# Patient Record
Sex: FEMALE | Race: BLACK OR AFRICAN AMERICAN | NOT HISPANIC OR LATINO | URBAN - METROPOLITAN AREA
[De-identification: names, ages, dates, MRNs, and addresses within clinical notes are randomized per-mention and may not be internally consistent; named-entity substitution may affect disease eponyms.]

---

## 2019-04-15 ENCOUNTER — INPATIENT (INPATIENT)
Facility: HOSPITAL | Age: 34
LOS: 1 days | Discharge: HOME | End: 2019-04-17
Attending: INTERNAL MEDICINE | Admitting: INTERNAL MEDICINE
Payer: COMMERCIAL

## 2019-04-15 VITALS
DIASTOLIC BLOOD PRESSURE: 66 MMHG | RESPIRATION RATE: 68 BRPM | SYSTOLIC BLOOD PRESSURE: 134 MMHG | HEART RATE: 68 BPM | TEMPERATURE: 97 F | OXYGEN SATURATION: 100 %

## 2019-04-15 LAB
ALBUMIN SERPL ELPH-MCNC: 4.6 G/DL — SIGNIFICANT CHANGE UP (ref 3.5–5.2)
ALP SERPL-CCNC: 77 U/L — SIGNIFICANT CHANGE UP (ref 30–115)
ALT FLD-CCNC: 21 U/L — SIGNIFICANT CHANGE UP (ref 0–41)
ANION GAP SERPL CALC-SCNC: 15 MMOL/L — HIGH (ref 7–14)
AST SERPL-CCNC: 25 U/L — SIGNIFICANT CHANGE UP (ref 0–41)
BASOPHILS # BLD AUTO: 0.02 K/UL — SIGNIFICANT CHANGE UP (ref 0–0.2)
BASOPHILS NFR BLD AUTO: 0.3 % — SIGNIFICANT CHANGE UP (ref 0–1)
BILIRUB SERPL-MCNC: 0.4 MG/DL — SIGNIFICANT CHANGE UP (ref 0.2–1.2)
BUN SERPL-MCNC: 5 MG/DL — LOW (ref 10–20)
CALCIUM SERPL-MCNC: 9.4 MG/DL — SIGNIFICANT CHANGE UP (ref 8.5–10.1)
CHLORIDE SERPL-SCNC: 99 MMOL/L — SIGNIFICANT CHANGE UP (ref 98–110)
CO2 SERPL-SCNC: 21 MMOL/L — SIGNIFICANT CHANGE UP (ref 17–32)
CREAT SERPL-MCNC: 0.7 MG/DL — SIGNIFICANT CHANGE UP (ref 0.7–1.5)
EOSINOPHIL # BLD AUTO: 0 K/UL — SIGNIFICANT CHANGE UP (ref 0–0.7)
EOSINOPHIL NFR BLD AUTO: 0 % — SIGNIFICANT CHANGE UP (ref 0–8)
GLUCOSE SERPL-MCNC: 75 MG/DL — SIGNIFICANT CHANGE UP (ref 70–99)
HCT VFR BLD CALC: 40.8 % — SIGNIFICANT CHANGE UP (ref 37–47)
HGB BLD-MCNC: 13.3 G/DL — SIGNIFICANT CHANGE UP (ref 12–16)
IMM GRANULOCYTES NFR BLD AUTO: 0.1 % — SIGNIFICANT CHANGE UP (ref 0.1–0.3)
LIDOCAIN IGE QN: 8 U/L — SIGNIFICANT CHANGE UP (ref 7–60)
LYMPHOCYTES # BLD AUTO: 0.96 K/UL — LOW (ref 1.2–3.4)
LYMPHOCYTES # BLD AUTO: 13.4 % — LOW (ref 20.5–51.1)
MCHC RBC-ENTMCNC: 26 PG — LOW (ref 27–31)
MCHC RBC-ENTMCNC: 32.6 G/DL — SIGNIFICANT CHANGE UP (ref 32–37)
MCV RBC AUTO: 79.7 FL — LOW (ref 81–99)
MONOCYTES # BLD AUTO: 0.26 K/UL — SIGNIFICANT CHANGE UP (ref 0.1–0.6)
MONOCYTES NFR BLD AUTO: 3.6 % — SIGNIFICANT CHANGE UP (ref 1.7–9.3)
NEUTROPHILS # BLD AUTO: 5.92 K/UL — SIGNIFICANT CHANGE UP (ref 1.4–6.5)
NEUTROPHILS NFR BLD AUTO: 82.6 % — HIGH (ref 42.2–75.2)
NRBC # BLD: 0 /100 WBCS — SIGNIFICANT CHANGE UP (ref 0–0)
PLATELET # BLD AUTO: 370 K/UL — SIGNIFICANT CHANGE UP (ref 130–400)
POTASSIUM SERPL-MCNC: 4.1 MMOL/L — SIGNIFICANT CHANGE UP (ref 3.5–5)
POTASSIUM SERPL-SCNC: 4.1 MMOL/L — SIGNIFICANT CHANGE UP (ref 3.5–5)
PROT SERPL-MCNC: 7.7 G/DL — SIGNIFICANT CHANGE UP (ref 6–8)
RBC # BLD: 5.12 M/UL — SIGNIFICANT CHANGE UP (ref 4.2–5.4)
RBC # FLD: 13.4 % — SIGNIFICANT CHANGE UP (ref 11.5–14.5)
SODIUM SERPL-SCNC: 135 MMOL/L — SIGNIFICANT CHANGE UP (ref 135–146)
WBC # BLD: 7.17 K/UL — SIGNIFICANT CHANGE UP (ref 4.8–10.8)
WBC # FLD AUTO: 7.17 K/UL — SIGNIFICANT CHANGE UP (ref 4.8–10.8)

## 2019-04-15 PROCEDURE — 76705 ECHO EXAM OF ABDOMEN: CPT | Mod: 26

## 2019-04-15 PROCEDURE — 71046 X-RAY EXAM CHEST 2 VIEWS: CPT | Mod: 26

## 2019-04-15 PROCEDURE — 99285 EMERGENCY DEPT VISIT HI MDM: CPT

## 2019-04-15 RX ORDER — MORPHINE SULFATE 50 MG/1
4 CAPSULE, EXTENDED RELEASE ORAL ONCE
Qty: 0 | Refills: 0 | Status: DISCONTINUED | OUTPATIENT
Start: 2019-04-15 | End: 2019-04-15

## 2019-04-15 RX ORDER — SODIUM CHLORIDE 9 MG/ML
1000 INJECTION, SOLUTION INTRAVENOUS
Qty: 0 | Refills: 0 | Status: DISCONTINUED | OUTPATIENT
Start: 2019-04-15 | End: 2019-04-16

## 2019-04-15 RX ORDER — ENOXAPARIN SODIUM 100 MG/ML
40 INJECTION SUBCUTANEOUS DAILY
Qty: 0 | Refills: 0 | Status: DISCONTINUED | OUTPATIENT
Start: 2019-04-15 | End: 2019-04-17

## 2019-04-15 RX ORDER — MORPHINE SULFATE 50 MG/1
2 CAPSULE, EXTENDED RELEASE ORAL EVERY 6 HOURS
Qty: 0 | Refills: 0 | Status: DISCONTINUED | OUTPATIENT
Start: 2019-04-15 | End: 2019-04-15

## 2019-04-15 RX ORDER — SODIUM CHLORIDE 9 MG/ML
1000 INJECTION, SOLUTION INTRAVENOUS ONCE
Qty: 0 | Refills: 0 | Status: COMPLETED | OUTPATIENT
Start: 2019-04-15 | End: 2019-04-15

## 2019-04-15 RX ORDER — CIPROFLOXACIN LACTATE 400MG/40ML
400 VIAL (ML) INTRAVENOUS EVERY 12 HOURS
Qty: 0 | Refills: 0 | Status: DISCONTINUED | OUTPATIENT
Start: 2019-04-15 | End: 2019-04-16

## 2019-04-15 RX ORDER — ONDANSETRON 8 MG/1
4 TABLET, FILM COATED ORAL
Qty: 0 | Refills: 0 | Status: DISCONTINUED | OUTPATIENT
Start: 2019-04-15 | End: 2019-04-16

## 2019-04-15 RX ORDER — METRONIDAZOLE 500 MG
500 TABLET ORAL EVERY 8 HOURS
Qty: 0 | Refills: 0 | Status: DISCONTINUED | OUTPATIENT
Start: 2019-04-15 | End: 2019-04-16

## 2019-04-15 RX ORDER — MORPHINE SULFATE 50 MG/1
2 CAPSULE, EXTENDED RELEASE ORAL ONCE
Qty: 0 | Refills: 0 | Status: DISCONTINUED | OUTPATIENT
Start: 2019-04-15 | End: 2019-04-15

## 2019-04-15 RX ORDER — AMITRIPTYLINE HCL 25 MG
50 TABLET ORAL AT BEDTIME
Qty: 0 | Refills: 0 | Status: DISCONTINUED | OUTPATIENT
Start: 2019-04-15 | End: 2019-04-17

## 2019-04-15 RX ORDER — ONDANSETRON 8 MG/1
8 TABLET, FILM COATED ORAL THREE TIMES A DAY
Qty: 0 | Refills: 0 | Status: DISCONTINUED | OUTPATIENT
Start: 2019-04-15 | End: 2019-04-15

## 2019-04-15 RX ORDER — CHLORHEXIDINE GLUCONATE 213 G/1000ML
1 SOLUTION TOPICAL
Qty: 0 | Refills: 0 | Status: DISCONTINUED | OUTPATIENT
Start: 2019-04-15 | End: 2019-04-17

## 2019-04-15 RX ORDER — PANTOPRAZOLE SODIUM 20 MG/1
40 TABLET, DELAYED RELEASE ORAL ONCE
Qty: 0 | Refills: 0 | Status: COMPLETED | OUTPATIENT
Start: 2019-04-15 | End: 2019-04-15

## 2019-04-15 RX ORDER — PANTOPRAZOLE SODIUM 20 MG/1
40 TABLET, DELAYED RELEASE ORAL
Qty: 0 | Refills: 0 | Status: DISCONTINUED | OUTPATIENT
Start: 2019-04-15 | End: 2019-04-16

## 2019-04-15 RX ORDER — KETOROLAC TROMETHAMINE 30 MG/ML
30 SYRINGE (ML) INJECTION ONCE
Qty: 0 | Refills: 0 | Status: DISCONTINUED | OUTPATIENT
Start: 2019-04-15 | End: 2019-04-15

## 2019-04-15 RX ORDER — MORPHINE SULFATE 50 MG/1
4 CAPSULE, EXTENDED RELEASE ORAL EVERY 6 HOURS
Qty: 0 | Refills: 0 | Status: DISCONTINUED | OUTPATIENT
Start: 2019-04-15 | End: 2019-04-17

## 2019-04-15 RX ADMIN — Medication 10 MILLIGRAM(S): at 15:45

## 2019-04-15 RX ADMIN — MORPHINE SULFATE 2 MILLIGRAM(S): 50 CAPSULE, EXTENDED RELEASE ORAL at 20:20

## 2019-04-15 RX ADMIN — MORPHINE SULFATE 4 MILLIGRAM(S): 50 CAPSULE, EXTENDED RELEASE ORAL at 15:45

## 2019-04-15 RX ADMIN — Medication 50 MILLIGRAM(S): at 22:25

## 2019-04-15 RX ADMIN — SODIUM CHLORIDE 2000 MILLILITER(S): 9 INJECTION, SOLUTION INTRAVENOUS at 15:45

## 2019-04-15 RX ADMIN — MORPHINE SULFATE 4 MILLIGRAM(S): 50 CAPSULE, EXTENDED RELEASE ORAL at 23:08

## 2019-04-15 RX ADMIN — SODIUM CHLORIDE 100 MILLILITER(S): 9 INJECTION, SOLUTION INTRAVENOUS at 23:56

## 2019-04-15 RX ADMIN — PANTOPRAZOLE SODIUM 40 MILLIGRAM(S): 20 TABLET, DELAYED RELEASE ORAL at 16:52

## 2019-04-15 RX ADMIN — Medication 30 MILLIGRAM(S): at 22:33

## 2019-04-15 RX ADMIN — MORPHINE SULFATE 4 MILLIGRAM(S): 50 CAPSULE, EXTENDED RELEASE ORAL at 23:30

## 2019-04-15 RX ADMIN — ONDANSETRON 4 MILLIGRAM(S): 8 TABLET, FILM COATED ORAL at 22:53

## 2019-04-15 RX ADMIN — Medication 100 MILLIGRAM(S): at 22:25

## 2019-04-15 NOTE — ED ADULT NURSE NOTE - NSIMPLEMENTINTERV_GEN_ALL_ED
Implemented All Universal Safety Interventions:  [place] to call system. Call bell, personal items and telephone within reach. Instruct patient to call for assistance. Room bathroom lighting operational. Non-slip footwear when patient is off stretcher. Physically safe environment: no spills, clutter or unnecessary equipment. Stretcher in lowest position, wheels locked, appropriate side rails in place.

## 2019-04-15 NOTE — H&P ADULT - NSHPLABSRESULTS_GEN_ALL_CORE
13.3   7.17  )-----------( 370      ( 15 Apr 2019 16:01 )             40.8     04-15  135  |  99  |  5<L>  ----------------------------<  75  4.1   |  21  |  0.7  Ca    9.4      15 Apr 2019 16:01  TPro  7.7  /  Alb  4.6  /  TBili  0.4  /  DBili  x   /  AST  25  /  ALT  21  /  AlkPhos  77  04-15    LIVER FUNCTIONS - ( 15 Apr 2019 16:01 )  Alb: 4.6 g/dL / Pro: 7.7 g/dL / ALK PHOS: 77 U/L / ALT: 21 U/L / AST: 25 U/L / GGT: x

## 2019-04-15 NOTE — ED PROVIDER NOTE - CARE PLAN
Principal Discharge DX:	Epigastric pain  Secondary Diagnosis:	Non-intractable vomiting with nausea, unspecified vomiting type

## 2019-04-15 NOTE — ED PROVIDER NOTE - CLINICAL SUMMARY MEDICAL DECISION MAKING FREE TEXT BOX
32 Y/O F WITH ABD PAIN AND INTRACTABLE VOMITING. PT DISCHARGED FROM [facility] TODAY AND SXS PERSISTENT,. ALL DIAGNOSTIS TESTING REVIEWED. PTS SXS DID NOT IMPROVE IN THE ED WITH TX AND PT WAS ADMITTED TO MEDICINE. CT A/P FROM [facility] REVIEWED AND SCANNED TO CHART.

## 2019-04-15 NOTE — H&P ADULT - NSHPPHYSICALEXAM_GEN_ALL_CORE
General: young woman reclining on her side, c/o abd pain  Cardiac: RRR, S1S2  Lungs: CTAB  Abd: +pain on palpation in the left lower quadrant and the mid lower quadrant  LE: no swelling  Neuro: AAOx3, nonfocal

## 2019-04-15 NOTE — ED PROVIDER NOTE - NS ED ROS FT
Review of Systems    Constitutional: (-) fever  Eyes/ENT: (-) change in vision, (-) sore throat, (-) ear pain  Cardiovascular: (-) chest pain, (-) palpitation   Respiratory: (-) cough, (-) shortness of breath  Gastrointestinal: SEE HPI (-) diarrhea  Musculoskeletal: (-) neck pain, (-) back pain, (-) joint pain  : SEE HPI  Integumentary: (-) rash, (-) edema  Neurological: (-) headache, (-) altered mental status  Heme/Lymph: (-) easy bruising (-) easy bleeding

## 2019-04-15 NOTE — ED PROVIDER NOTE - OBJECTIVE STATEMENT
34 yo f pmhx sig for gastritis, h. pylori infxn pw gnawing and burning abdominal pain for 4 d in duration associated with NBNB vomiting with no dark stools or diarrhea. The abd pain has no provoking or modefying factors. Denies dysuria, urgency, frequency, vaginal bleeding or discharge. Of note pt was seen in the [facility] w/in 24 with CT showing a colitis pt was treated with Cipro/Flagyl first dose taken.     I have reviewed available current nursing and previous documentation of past medical, surgical, family, and/or social history.

## 2019-04-15 NOTE — H&P ADULT - HISTORY OF PRESENT ILLNESS
34 y/o F with PMHx H pylori s/p clearance of infection, recurrent colitis, C diff colitis, IBS , had egd and colonoscopy done 2 yrs ago that showed the h pylori and gastritis, for which she was on protonix. Pt had been on protonix 2 yrs ago, but started to use marijuana instead for her sx, which noticed helped her. She smokes 3 joints/day for the past 2 yrs. Hx goes back to 3 wks when she started experiencing nausea and vomiting, with bloody vomitus, which persisted for 2 days. She went to the ER, but was not admitted. Her sx resolved on their own and she was back to baseline. However, starting 3 days ptp she started experiencing LLQ and mid lower quadrant pain, sharp in nature, episodic, lasting for 45min and then goes away for 10-15min, but comes back. She has also been experiencing midgastric burning sensation, which she attributes to gastritis. She does not notice any specific pattern with food intake, but she is not able to keep any food down on her own. She went to [facility] for this, where she had a ct scan done that showed ____, she was d/c earlier today, but she did not see a GI doctor while there. Pt denies any fevers, but she has been experiencing chills. Her bowel movements usually alternate between diarrhea and constipation, but recently it is leaning more towards constipation. 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 32 y/o F with PMHx H pylori s/p clearance of infection, recurrent colitis, C diff colitis, IBS , had egd and colonoscopy done 2 yrs ago that showed the h pylori and gastritis, for which she was on protonix. Pt had been on protonix 2 yrs ago, but started to use marijuana instead for her sx, which noticed helped her. She smokes 3 joints/day for the past 2 yrs. Hx goes back to 3 wks when she started experiencing nausea and vomiting, with bloody vomitus, which persisted for 2 days. She went to the ER, but was not admitted. Her sx resolved on their own and she was back to baseline. However, starting 3 days ptp she started experiencing LLQ and mid lower abdominal pain, sharp in nature, episodic, lasting for 45min and then goes away for 10-15min, but comes back. She has also been experiencing midgastric burning sensation, which she attributes to gastritis. She does not notice any specific pattern with food intake, but she is not able to keep any food down on her own. She went to [facility] for this, where she had a ct scan done that showed ____, she was d/c earlier today, but she did not see a GI doctor while there. Pt denies any fevers, but she has been experiencing chills. Her bowel movements usually alternate between diarrhea and constipation, but recently it is leaning more towards constipation. 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 34 y/o F with PMHx H pylori s/p clearance of infection, recurrent colitis, C diff colitis, IBS , had egd and colonoscopy done 2 yrs ago that showed the h pylori and gastritis, for which she was on protonix. Pt had been on protonix 2 yrs ago, but started to use marijuana instead for her sx, which noticed helped her. She smokes 3 joints/day for the past 2 yrs. Hx goes back to 3 wks when she started experiencing nausea and vomiting, with bloody vomitus, which persisted for 2 days. She went to the ER, but was not admitted. Her sx resolved on their own and she was back to baseline. However, starting 3 days ptp she started experiencing LLQ and mid lower abdominal pain, sharp in nature, episodic, lasting for 45min and then goes away for 10-15min, but comes back. She has also been experiencing midgastric burning sensation, which she attributes to gastritis. She does not notice any specific pattern with food intake, but she is not able to keep any food down on her own. She went to [facility] for this, where she had a ct scan done 4/13/19 that showed "Portions of the ascending colon and descending colon are partially collapsed. Slight thickening of the walls. This could represent an early colitis." She was d/c earlier today, but she did not see a GI doctor while there. Pt denies any fevers, but she has been experiencing chills. Her bowel movements usually alternate between diarrhea and constipation, but recently it is leaning more towards constipation. 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 34 y/o F with PMHx H pylori s/p clearance of infection, recurrent colitis, C diff colitis, IBS , had egd and colonoscopy done 2 yrs ago that showed the h pylori and gastritis, for which she was on protonix. Pt had been on protonix 2 yrs ago, but started to use marijuana instead for her sx, which noticed helped her. She smokes 3 joints/day for the past 2 yrs. Hx goes back to 3 wks when she started experiencing nausea and vomiting, with bloody vomitus, which persisted for 2 days. She went to the ER, but was not admitted. Her sx resolved on their own and she was back to baseline. However, starting 3 days ptp she started experiencing LLQ and mid lower abdominal pain, sharp in nature, episodic, lasting for 45min and then goes away for 10-15min, but comes back. She has also been experiencing midgastric burning sensation, which she attributes to gastritis. She does not notice any specific pattern with food intake, but she is not able to keep any food down on her own. She went to [facility] for this, where she had a ct scan done 4/13/19 that showed "Portions of the ascending colon and descending colon are partially collapsed. Slight thickening of the walls. This could represent an early colitis." She was d/c earlier today, but she did not see a GI doctor while there. Pt denies any fevers, but she has been experiencing chills. Her bowel movements usually alternate between diarrhea and constipation, but recently it is leaning more towards constipation. 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     Attd: pt has hx of abd issues (H pylori and C Diff (after abx for UTI)) in past (3-4 yrs ago); w/ hx of smoking marijuana (2 joints/day) for the past 1-1.5 yrs - which started to help chronic nausea; pt has not lost weight; pt has been vomiting on/off for 1 wk - worst day was yesterday; she is still having trouble keeping food down. Pt on IVFs. Pt was admitted to [facility] and dx'd w/ colitis (but there is no diarrhea now - she said she had diarrhea, but now she is constipated); pt stopped smoking marijuana about 3 days ago; pt does not feel the need to take showers to relieve symptoms; she has no fever, but did have chills; pt looks fairly comfortable now, but did recently vomit this am - bilious, no blood; no SOB, no CP; rest as above

## 2019-04-15 NOTE — ED PROVIDER NOTE - PROGRESS NOTE DETAILS
I personally evaluated the patient. I reviewed the Resident’s or Physician Assistant’s note (as assigned above), and agree with the findings and plan except as documented in my note.   32 Y/O F IBS, GERD, H/O C DIFF COLITIS, C/O 5 DAYS OF ABD PAIN. ABD PAIN IS UPPER ABD AND IS CONSTANT AND ASSOCIATED WITH N/V. + CONSTIPATIONS, LAST BM 2 ADYS AGO. NO BPR OR MELENA. NO HEMATEMESIS. NO FEVER, CHILLS. PT ADMITTED TO [facility] YESTERDAY AND DISCHARGED TODAY. CT A/P REVIEWED AND SCANNED TO CHART. PT HAD EGD AND COLONOSCOPY APPROX ONE YEAR AGO. VITALS NOTED. ALERT OX3 NAD WELL APPEARING. NCAT PERRL. EOMI. OP NORMAL. MMM. NECK SUPPLE. LUNGS CLEAR B/L. RRR. S1S2. ABD- SOFT, DISTENDED. + DIFFUSE TENDERNESS. NO REBOUND OR GUARDING. NO LEG EDEMA. CN 2-12 INTACT. NEURO EXAM NONFOCAL.

## 2019-04-15 NOTE — H&P ADULT - NSICDXPASTMEDICALHX_GEN_ALL_CORE_FT
PAST MEDICAL HISTORY:  Colitis     Gastritis, Helicobacter pylori     H/O Clostridium difficile infection     History of marijuana use

## 2019-04-15 NOTE — ED ADULT NURSE REASSESSMENT NOTE - NS ED NURSE REASSESS COMMENT FT1
Pt c/o pain at this time, OLEKSANDR Montgomery made aware, states he will come to evaluate at bedside as soon as he can. Pt in no acute distress, will continue to monitor pt.

## 2019-04-15 NOTE — H&P ADULT - ASSESSMENT
Recurrent Colitis w/ Concomittant Cannabinoid hyperemesis syndrome  -ct scan from [facility] shows:  -will check RUQ sono to check biliary tract/gallstones, no need to repeat scan at this time  -start cipro and flagyl  -monitor for any signs of infection  -check c diff considering pt has hx of it, send stool pcr studies  -morphine prn for pain control  -IVF, zofran, protonix  -pt has been educated to stop w/ cannaboid use, c/w amitriptyline  -GI c/s-why does pt have so many recurrent bouts of colitis? scope candidate?  -pt does not appear to be pain-seeking    DVT PPX: lovenox sq Recurrent Colitis w/ Concomittant Cannabinoid hyperemesis syndrome  -ct scan from [facility] shows:  -will check RUQ sono to check biliary tract/gallstones, no need to repeat scan at this time  -start cipro and flagyl  -monitor for any signs of infection  -check c diff considering pt has hx of it, send stool pcr studies  -morphine prn for pain control  -IVF, zofran, protonix  -pt has been educated to stop w/ cannaboid use, c/w amitriptyline  -GI c/s-why does pt have so many recurrent bouts of colitis? scope candidate?  -pt does not appear to be pain-seeking  -check ua since there is pain in the lower midabd area  -pregnancy neg    DVT PPX: lovenox sq Recurrent Colitis w/ Concomittant Cannabinoid hyperemesis syndrome  -ct scan from [facility] shows:  -will check RUQ sono to check biliary tract/gallstones, no need to repeat scan at this time  -start cipro and flagyl  -monitor for any signs of infection  -check c diff considering pt has hx of it, send stool pcr studies  -morphine prn for pain control  -IVF, zofran, protonix  -pt has been educated to stop w/ cannaboid use, c/w amitriptyline  -GI c/s-why does pt have so many recurrent bouts of colitis? scope candidate?  -pt does not appear to be pain-seeking  -check ua since there is pain in the lower midabdominal area  -pregnancy neg    DVT PPX: lovenox sq Recurrent Colitis w/ Concomittant Cannabinoid hyperemesis syndrome  -CT scan from [facility] 4/13/19 shows concern for early colitis  -will check RUQ sono to check biliary tract/gallstones, no need to repeat scan at this time  -start cipro and flagyl  -monitor for any signs of infection  -check c diff considering pt has hx of it, send stool pcr studies  -morphine prn for pain control  -IVF, zofran, protonix  -pt has been educated to stop w/ cannaboid use, c/w amitriptyline  -GI c/s-why does pt have so many recurrent bouts of colitis? scope candidate?  -pt does not appear to be pain-seeking  -check ua since there is pain in the lower midabdominal area  -pregnancy neg    DVT PPX: lovenox sq # prob cannabinoid hyperemesis syndrome  CT scan from [facility] 4/13/19 shows concern for "early colitis" - but this could be just underdistention of colon - will try to obtain report  check RUQ sono to check biliary tract/gallstones  d/c cipro and flagyl - doubt colitis  if pt has diarrhea, check for c diff  morphine 4mg iv q6 prn pain for now  d/c toradol  zofran was not helping, try reglan 10mg iv q6 prn n/v  offered benzo - pt declined  IVFs - D5 100/hr  diet: npo per GI  GI will likely do EGD here, poss   GI might do c-scope, might be outpt - will see  pt has been educated to stop w/ cannaboid use, c/w amitriptyline  no dysuria, so would not check u/a  pregnancy neg  PPI iv q24    # DVT PPX: lovenox sq    Dispo: f/u GI; cannot leave until she can stefan food and water w/out vomiting and pain better

## 2019-04-15 NOTE — H&P ADULT - NSHPSOCIALHISTORY_GEN_ALL_CORE
Smokes 3 joints/day for the past 2 yrs. Denies cig use, used to smoker 1 pack/wk for 5-6 yrs but stopped a couple yrs ago. Smokes 3 joints/day for the past 2 yrs. Denies cig use, used to smoker 1 pack/wk for 5-6 yrs but stopped a couple yrs ago.  denies alcohol abuse  no other drug abuse, besides marijuana

## 2019-04-15 NOTE — ED PROVIDER NOTE - PHYSICAL EXAMINATION
Physical Exam    Vital Signs: I have reviewed the initial vital signs.  Constitutional: well-nourished, appears stated age, no acute distress  Eyes: PERRLA, and symmetrical lids.  ENT: Neck supple with no adenopathy, moist MM.  Cardiovascular: regular rate, regular rhythm, well-perfused extremities  Respiratory: unlabored respiratory effort, clear to auscultation bilaterally  Gastrointestinal: soft, +mild epigastric ttp, no guarding, non distended, no rebound ttp  Musculoskeletal: supple neck, no lower extremity edema  Integumentary: warm, dry, no rash  Neurologic: awake, alert, extremities’ motor and sensory functions grossly intact  Psychiatric: A&Ox3

## 2019-04-15 NOTE — ED ADULT NURSE NOTE - CHIEF COMPLAINT QUOTE
Pt complains of abdominal pain Pt was seen this morning at [facility] and was discharged

## 2019-04-15 NOTE — ED ADULT NURSE NOTE - OBJECTIVE STATEMENT
Pt complains of lower abdominal pain x 2 days. Pt sattes she was previously at [facility] and was dc with colitis

## 2019-04-16 ENCOUNTER — RESULT REVIEW (OUTPATIENT)
Age: 34
End: 2019-04-16

## 2019-04-16 ENCOUNTER — TRANSCRIPTION ENCOUNTER (OUTPATIENT)
Age: 34
End: 2019-04-16

## 2019-04-16 LAB
ALBUMIN SERPL ELPH-MCNC: 3.6 G/DL — SIGNIFICANT CHANGE UP (ref 3.5–5.2)
ALP SERPL-CCNC: 60 U/L — SIGNIFICANT CHANGE UP (ref 30–115)
ALT FLD-CCNC: 16 U/L — SIGNIFICANT CHANGE UP (ref 0–41)
ANION GAP SERPL CALC-SCNC: 17 MMOL/L — HIGH (ref 7–14)
APTT BLD: 36.5 SEC — SIGNIFICANT CHANGE UP (ref 27–39.2)
AST SERPL-CCNC: 27 U/L — SIGNIFICANT CHANGE UP (ref 0–41)
BASOPHILS # BLD AUTO: 0.02 K/UL — SIGNIFICANT CHANGE UP (ref 0–0.2)
BASOPHILS # BLD AUTO: 0.04 K/UL — SIGNIFICANT CHANGE UP (ref 0–0.2)
BASOPHILS NFR BLD AUTO: 0.3 % — SIGNIFICANT CHANGE UP (ref 0–1)
BASOPHILS NFR BLD AUTO: 0.4 % — SIGNIFICANT CHANGE UP (ref 0–1)
BILIRUB SERPL-MCNC: 0.5 MG/DL — SIGNIFICANT CHANGE UP (ref 0.2–1.2)
BUN SERPL-MCNC: 8 MG/DL — LOW (ref 10–20)
CALCIUM SERPL-MCNC: 8.6 MG/DL — SIGNIFICANT CHANGE UP (ref 8.5–10.1)
CHLORIDE SERPL-SCNC: 104 MMOL/L — SIGNIFICANT CHANGE UP (ref 98–110)
CO2 SERPL-SCNC: 18 MMOL/L — SIGNIFICANT CHANGE UP (ref 17–32)
CREAT SERPL-MCNC: 0.7 MG/DL — SIGNIFICANT CHANGE UP (ref 0.7–1.5)
EOSINOPHIL # BLD AUTO: 0.02 K/UL — SIGNIFICANT CHANGE UP (ref 0–0.7)
EOSINOPHIL # BLD AUTO: 0.11 K/UL — SIGNIFICANT CHANGE UP (ref 0–0.7)
EOSINOPHIL NFR BLD AUTO: 0.3 % — SIGNIFICANT CHANGE UP (ref 0–8)
EOSINOPHIL NFR BLD AUTO: 1.2 % — SIGNIFICANT CHANGE UP (ref 0–8)
GLUCOSE BLDC GLUCOMTR-MCNC: 81 MG/DL — SIGNIFICANT CHANGE UP (ref 70–99)
GLUCOSE SERPL-MCNC: 54 MG/DL — LOW (ref 70–99)
HCT VFR BLD CALC: 34.3 % — LOW (ref 37–47)
HCT VFR BLD CALC: 39.1 % — SIGNIFICANT CHANGE UP (ref 37–47)
HGB BLD-MCNC: 11.3 G/DL — LOW (ref 12–16)
HGB BLD-MCNC: 12.7 G/DL — SIGNIFICANT CHANGE UP (ref 12–16)
IMM GRANULOCYTES NFR BLD AUTO: 0.2 % — SIGNIFICANT CHANGE UP (ref 0.1–0.3)
IMM GRANULOCYTES NFR BLD AUTO: 0.5 % — HIGH (ref 0.1–0.3)
INR BLD: 1.15 RATIO — SIGNIFICANT CHANGE UP (ref 0.65–1.3)
LYMPHOCYTES # BLD AUTO: 0.83 K/UL — LOW (ref 1.2–3.4)
LYMPHOCYTES # BLD AUTO: 12.6 % — LOW (ref 20.5–51.1)
LYMPHOCYTES # BLD AUTO: 2.67 K/UL — SIGNIFICANT CHANGE UP (ref 1.2–3.4)
LYMPHOCYTES # BLD AUTO: 30 % — SIGNIFICANT CHANGE UP (ref 20.5–51.1)
MCHC RBC-ENTMCNC: 26.2 PG — LOW (ref 27–31)
MCHC RBC-ENTMCNC: 26.2 PG — LOW (ref 27–31)
MCHC RBC-ENTMCNC: 32.5 G/DL — SIGNIFICANT CHANGE UP (ref 32–37)
MCHC RBC-ENTMCNC: 32.9 G/DL — SIGNIFICANT CHANGE UP (ref 32–37)
MCV RBC AUTO: 79.6 FL — LOW (ref 81–99)
MCV RBC AUTO: 80.6 FL — LOW (ref 81–99)
MONOCYTES # BLD AUTO: 0.15 K/UL — SIGNIFICANT CHANGE UP (ref 0.1–0.6)
MONOCYTES # BLD AUTO: 0.6 K/UL — SIGNIFICANT CHANGE UP (ref 0.1–0.6)
MONOCYTES NFR BLD AUTO: 2.3 % — SIGNIFICANT CHANGE UP (ref 1.7–9.3)
MONOCYTES NFR BLD AUTO: 6.7 % — SIGNIFICANT CHANGE UP (ref 1.7–9.3)
NEUTROPHILS # BLD AUTO: 5.46 K/UL — SIGNIFICANT CHANGE UP (ref 1.4–6.5)
NEUTROPHILS # BLD AUTO: 5.56 K/UL — SIGNIFICANT CHANGE UP (ref 1.4–6.5)
NEUTROPHILS NFR BLD AUTO: 61.5 % — SIGNIFICANT CHANGE UP (ref 42.2–75.2)
NEUTROPHILS NFR BLD AUTO: 84 % — HIGH (ref 42.2–75.2)
NRBC # BLD: 0 /100 WBCS — SIGNIFICANT CHANGE UP (ref 0–0)
NRBC # BLD: 0 /100 WBCS — SIGNIFICANT CHANGE UP (ref 0–0)
PLATELET # BLD AUTO: 310 K/UL — SIGNIFICANT CHANGE UP (ref 130–400)
PLATELET # BLD AUTO: 325 K/UL — SIGNIFICANT CHANGE UP (ref 130–400)
POTASSIUM SERPL-MCNC: 3.9 MMOL/L — SIGNIFICANT CHANGE UP (ref 3.5–5)
POTASSIUM SERPL-SCNC: 3.9 MMOL/L — SIGNIFICANT CHANGE UP (ref 3.5–5)
PROT SERPL-MCNC: 5.8 G/DL — LOW (ref 6–8)
PROTHROM AB SERPL-ACNC: 13.2 SEC — HIGH (ref 9.95–12.87)
RBC # BLD: 4.31 M/UL — SIGNIFICANT CHANGE UP (ref 4.2–5.4)
RBC # BLD: 4.85 M/UL — SIGNIFICANT CHANGE UP (ref 4.2–5.4)
RBC # FLD: 13.6 % — SIGNIFICANT CHANGE UP (ref 11.5–14.5)
RBC # FLD: 13.6 % — SIGNIFICANT CHANGE UP (ref 11.5–14.5)
SODIUM SERPL-SCNC: 139 MMOL/L — SIGNIFICANT CHANGE UP (ref 135–146)
WBC # BLD: 6.61 K/UL — SIGNIFICANT CHANGE UP (ref 4.8–10.8)
WBC # BLD: 8.9 K/UL — SIGNIFICANT CHANGE UP (ref 4.8–10.8)
WBC # FLD AUTO: 6.61 K/UL — SIGNIFICANT CHANGE UP (ref 4.8–10.8)
WBC # FLD AUTO: 8.9 K/UL — SIGNIFICANT CHANGE UP (ref 4.8–10.8)

## 2019-04-16 PROCEDURE — 88305 TISSUE EXAM BY PATHOLOGIST: CPT | Mod: 26

## 2019-04-16 PROCEDURE — 88312 SPECIAL STAINS GROUP 1: CPT | Mod: 26

## 2019-04-16 RX ORDER — PANTOPRAZOLE SODIUM 20 MG/1
40 TABLET, DELAYED RELEASE ORAL EVERY 12 HOURS
Qty: 0 | Refills: 0 | Status: DISCONTINUED | OUTPATIENT
Start: 2019-04-16 | End: 2019-04-17

## 2019-04-16 RX ORDER — SODIUM CHLORIDE 9 MG/ML
1000 INJECTION, SOLUTION INTRAVENOUS
Qty: 0 | Refills: 0 | Status: DISCONTINUED | OUTPATIENT
Start: 2019-04-16 | End: 2019-04-17

## 2019-04-16 RX ORDER — ALPRAZOLAM 0.25 MG
0.25 TABLET ORAL EVERY 8 HOURS
Qty: 0 | Refills: 0 | Status: DISCONTINUED | OUTPATIENT
Start: 2019-04-16 | End: 2019-04-17

## 2019-04-16 RX ORDER — METOCLOPRAMIDE HCL 10 MG
10 TABLET ORAL EVERY 8 HOURS
Qty: 0 | Refills: 0 | Status: DISCONTINUED | OUTPATIENT
Start: 2019-04-16 | End: 2019-04-17

## 2019-04-16 RX ORDER — SODIUM CHLORIDE 9 MG/ML
1000 INJECTION, SOLUTION INTRAVENOUS
Qty: 0 | Refills: 0 | Status: DISCONTINUED | OUTPATIENT
Start: 2019-04-16 | End: 2019-04-16

## 2019-04-16 RX ADMIN — SODIUM CHLORIDE 100 MILLILITER(S): 9 INJECTION, SOLUTION INTRAVENOUS at 10:52

## 2019-04-16 RX ADMIN — PANTOPRAZOLE SODIUM 40 MILLIGRAM(S): 20 TABLET, DELAYED RELEASE ORAL at 17:08

## 2019-04-16 RX ADMIN — MORPHINE SULFATE 4 MILLIGRAM(S): 50 CAPSULE, EXTENDED RELEASE ORAL at 06:19

## 2019-04-16 RX ADMIN — ONDANSETRON 4 MILLIGRAM(S): 8 TABLET, FILM COATED ORAL at 07:31

## 2019-04-16 RX ADMIN — Medication 50 MILLIGRAM(S): at 21:57

## 2019-04-16 RX ADMIN — Medication 200 MILLIGRAM(S): at 05:06

## 2019-04-16 RX ADMIN — Medication 0.25 MILLIGRAM(S): at 17:18

## 2019-04-16 RX ADMIN — MORPHINE SULFATE 4 MILLIGRAM(S): 50 CAPSULE, EXTENDED RELEASE ORAL at 11:30

## 2019-04-16 RX ADMIN — PANTOPRAZOLE SODIUM 40 MILLIGRAM(S): 20 TABLET, DELAYED RELEASE ORAL at 05:05

## 2019-04-16 RX ADMIN — MORPHINE SULFATE 4 MILLIGRAM(S): 50 CAPSULE, EXTENDED RELEASE ORAL at 06:35

## 2019-04-16 RX ADMIN — Medication 10 MILLIGRAM(S): at 20:31

## 2019-04-16 RX ADMIN — MORPHINE SULFATE 4 MILLIGRAM(S): 50 CAPSULE, EXTENDED RELEASE ORAL at 20:26

## 2019-04-16 RX ADMIN — Medication 10 MILLIGRAM(S): at 10:55

## 2019-04-16 RX ADMIN — Medication 100 MILLIGRAM(S): at 05:05

## 2019-04-16 RX ADMIN — MORPHINE SULFATE 4 MILLIGRAM(S): 50 CAPSULE, EXTENDED RELEASE ORAL at 11:14

## 2019-04-16 NOTE — CONSULT NOTE ADULT - ASSESSMENT
34 y/o F with PMHx H pylori s/p clearance of infection after 2 y of treatment as per pt ,C diff colitis, IBS mixed  , had egd and colonoscopy done 4  yrs ago that showed the h pylori and gastritis, for which she was on protonix and got treated multiple times until eradication. Pt had been on protonix 2 yrs ago, but started to use marijuana instead for her sx, which noticed helped her. She smokes 3 joints/day for the past 2 yrs. Hx goes back to last friday  when she started experiencing nausea and vomiting, with streaks of blood , which .associated with  LLQ and mid lower abdominal pain, sharp in nature, episodic, lasting for 45min and then goes away for 10-15min, but comes back. She has also been experiencing midgastric burning sensation, which she attributes to gastritis. She does not notice any specific pattern with food intake, but she is not able to keep any food down on her own. She went to [facility] for this, where she had a ct scan done 4/13/19 that showed "Portions of the ascending colon and descending colon are partially collapsed. Slight thickening of the walls. This could represent an early colitis." She was d/c earlier yesterday  but she did not see a GI doctor while there. Pt denies any fevers, but she has been experiencing chills. Her bowel movements usually alternate between diarrhea and constipation, but recently it is leaning more towards constipation. Last BM was yesterday morning and it was hard small stool. Patient not passing gas since few days    Scope Hx:    Had 2 EGD and 2 colonoscopy in the past , last one 4 y ago that showed H Pylori gastritis and Colon inflammation as per patient. No reports available     # Abdominal pain/ Nausea vomiting/ reflux   Rule out Gastroenteritis vs PUD vs less likely obstruction   Keep NPO for now   Protonix 40 IV BID  Zofran for nausea   get US abdomen   If patient develops diarrhea check stool for C Diff and GI PCR   Will discuss with attending repeat CT abdomen and pelvis with contrast   Will need EGD and colonoscopy (will discuss with attending the timing) 32 y/o F with PMHx H pylori s/p clearance of infection after 2 y of treatment as per pt ,C diff colitis, IBS mixed  , had egd and colonoscopy done 4  yrs ago that showed the h pylori and gastritis, for which she was on protonix and got treated multiple times until eradication. Pt had been on protonix 2 yrs ago, but started to use marijuana instead for her sx, which noticed helped her. She smokes 3 joints/day for the past 2 yrs. Hx goes back to last friday  when she started experiencing nausea and vomiting, with streaks of blood , which .associated with  LLQ and mid lower abdominal pain, sharp in nature, episodic, lasting for 45min and then goes away for 10-15min, but comes back. She has also been experiencing midgastric burning sensation, which she attributes to gastritis. She does not notice any specific pattern with food intake, but she is not able to keep any food down on her own. She went to [facility] for this, where she had a ct scan done 4/13/19 that showed "Portions of the ascending colon and descending colon are partially collapsed. Slight thickening of the walls. This could represent an early colitis." She was d/c earlier yesterday  but she did not see a GI doctor while there. Pt denies any fevers, but she has been experiencing chills. Her bowel movements usually alternate between diarrhea and constipation, but recently it is leaning more towards constipation. Last BM was yesterday morning and it was hard small stool. Patient not passing gas since few days    Scope Hx:    Had 2 EGD and 2 colonoscopy in the past , last one 4 y ago that showed H Pylori gastritis and Colon inflammation as per patient. No reports available     # Abdominal pain/ Nausea vomiting/ reflux   Rule out Gastroenteritis vs PUD vs less likely obstruction   Keep NPO for now   Protonix 40 IV BID  Zofran for nausea   If patient develops diarrhea check stool for C Diff and GI PCR   will do EGD today   Please get CT abdomen results from [facility]

## 2019-04-16 NOTE — PROGRESS NOTE ADULT - ASSESSMENT
This is a 33 year old female presenting with a chief complaint of nausea/vomiting x3 weeks. Three days prior to this presentation, however, she developed LLQ and mid-lower abdominal pain that she described as sharp and intermittent in nature. She also complains of epigastric pain that she describes as burning/discomfort. She does not attribute worsening of her symptoms with food intake, but feels she cannot keep her food down. She initially went to [facility] and on 04/13 a CT was done and showed "Portions of the ascending colon and descending colon are partially collapsed. Slight thickening of the walls. This could represent an early colitis." She was later discharged. Of note, she had an EGD performed 2 years ago that revealed gastritis; previously was on Protonix but stopped as she found symptomatic relief with Marijuana use (smoking ~3 "joints" per day x2 yrs).     ===========================================================  Today/Updates:  Still complains of N/V (2 episodes NB/NB). Also complains of abdominal pain in epigastrum, RUQ, and lower quadrants. RUQ ultrasound was not remarkable. GI is on board and will see the patient today. Unfortunately, the CT scan report from [facility] is not in the chart and we are actively seeking to locate the report. Spoke to GI briefly and may need to repeat the CT scan. She may also need endoscopy/colonscopy, but this may be done as an outpatient pending Hg does not continue to drop and she can tolerate diet.   ===========================================================    ?RECURRENT COLITIS; HX OF C. DIFFICILE COLITIS  - CT A/P (04/13) @ [facility] suggested early colitis, per HPI; Report not in chart, however  - Hx of alternating diarrhea/constipation  - Ciprofloxacin and Flagyl started on admission; Discontinued with no fever or leukocytosis  - C. difficile stool Ag ordered  - May need repeat CT if copy or [facility] CT cannot obtained    EMESIS WITH NAUSEA/VOMITING; POSSIBLY DUE GASTRITIS vs. EXCESSIVE MARIJUANA USE; HX OF H. PYLORI (treated)  - Started Protonix; Zofran ineffective, and starting Reglan  - 2 point drop in Hg but patient receiving IVF; Following CBC  - RUQ ultrasound negative for significant findings  - Patient educated of Marijuana use cessation  - Will continue home Amitriptyline  - Follow up GI recommendations    Electrolyte Imbalances: WNL      GI ppx:                                   [] Not indicated   /   [X] Pantoprazole 40mg IV BID    DVT ppx:  [X] Lovenox 40mg SubQ    Fluids:   [X] IVF    Activity:  [X] Ad Kristie  /  [X] Increase as Tolerated  /  [] OOB w/ assist  /  [] Bed Rest    BMI:  Height (cm): 154.94 (04-15)  Weight (kg): 65.4 (04-15)  BMI (kg/m2): 27.2 (04-15)    DISPO:  Patient to be discharged when condition(s) optimized.  [X] Home      [X] Discussion with patient and/or proxy regarding goals of care.  [X] Discussed Case and Plan with the Medical Attending.    CODE STATUS  [X] FULL     Please call Dr. Mcclelland [PGY-1] with any questions/consult recs: Spectra #[phone] This is a 33 year old female presenting with a chief complaint of nausea/vomiting x3 weeks. Three days prior to this presentation, however, she developed LLQ and mid-lower abdominal pain that she described as sharp and intermittent in nature. She also complains of epigastric pain that she describes as burning/discomfort. She does not attribute worsening of her symptoms with food intake, but feels she cannot keep her food down. She initially went to [facility] and on 04/13 a CT was done and showed "Portions of the ascending colon and descending colon are partially collapsed. Slight thickening of the walls. This could represent an early colitis." She was later discharged. Of note, she had an EGD performed 2 years ago that revealed gastritis; previously was on Protonix but stopped as she found symptomatic relief with Marijuana use (smoking ~3 "joints" per day x2 yrs).     ===========================================================  Today/Updates:  Still complains of N/V (2 episodes NB/NB). Also complains of abdominal pain in epigastrum, RUQ, and lower quadrants. RUQ ultrasound was not remarkable. GI is on board and will see the patient today. Unfortunately, the CT scan report from [facility] is not in the chart and we are actively seeking to locate the report. Spoke to GI briefly and may need to repeat the CT scan. She may also need endoscopy/colonscopy, but this may be done as an outpatient pending Hg does not continue to drop and she can tolerate diet.   ===========================================================    EMESIS WITH NAUSEA/VOMITING; POSSIBLY DUE GASTRITIS/PUD vs. EXCESSIVE MARIJUANA USE; HX OF H. PYLORI (treated)  - Started Protonix; Zofran ineffective, and starting Reglan  - 2 point drop in Hg but patient receiving IVF; Following CBC  - RUQ ultrasound negative for significant findings  - Patient educated of Marijuana use cessation  - Will continue home Amitriptyline  - Follow up GI recommendations  - NPO for now    ?RECURRENT COLITIS; HX OF C. DIFFICILE COLITIS  - CT A/P (04/13) @ [facility] suggested early colitis, per HPI; Report not in chart, however  - Hx of alternating diarrhea/constipation  - Ciprofloxacin and Flagyl started on admission; Discontinued with no fever or leukocytosis  - C. difficile stool Ag ordered  - May need repeat CT if copy or [facility] CT cannot obtained    Electrolyte Imbalances: WNL      GI ppx:                                   [] Not indicated   /   [X] Pantoprazole 40mg IV BID    DVT ppx:  [X] Lovenox 40mg SubQ    Fluids:   [X] IVF    Activity:  [X] Ad Kristie  /  [X] Increase as Tolerated  /  [] OOB w/ assist  /  [] Bed Rest    BMI:  Height (cm): 154.94 (04-15)  Weight (kg): 65.4 (04-15)  BMI (kg/m2): 27.2 (04-15)    DISPO:  Patient to be discharged when condition(s) optimized.  [X] Home      [X] Discussion with patient and/or proxy regarding goals of care.  [X] Discussed Case and Plan with the Medical Attending.    CODE STATUS  [X] FULL     Please call Dr. Mcclelland [[email or web address]-[email or web address]] with any questions/consult recs: Spectra #[phone] This is a 33 year old female presenting with a chief complaint of nausea/vomiting x3 weeks. Three days prior to this presentation, however, she developed LLQ and mid-lower abdominal pain that she described as sharp and intermittent in nature. She also complains of epigastric pain that she describes as burning/discomfort. She does not attribute worsening of her symptoms with food intake, but feels she cannot keep her food down. She initially went to [facility] and on 04/13 a CT was done and showed "Portions of the ascending colon and descending colon are partially collapsed. Slight thickening of the walls. This could represent an early colitis." She was later discharged. Of note, she had an EGD performed 2 years ago that revealed gastritis; previously was on Protonix but stopped as she found symptomatic relief with Marijuana use (smoking ~3 "joints" per day x2 yrs).     ===========================================================  Today/Updates:  Still complains of N/V (2 episodes NB/NB). Also complains of abdominal pain in epigastrum, RUQ, and lower quadrants. RUQ ultrasound was not remarkable. GI is on board and will see the patient today. Unfortunately, the CT scan report from [facility] is not in the chart and we are actively seeking to locate the report. Spoke to GI briefly and may need to repeat the CT scan. She may also need endoscopy/colonscopy, but this may be done as an outpatient pending Hg does not continue to drop and she can tolerate diet.   ===========================================================    EMESIS WITH NAUSEA/VOMITING; POSSIBLY DUE GASTRITIS/PUD vs. EXCESSIVE MARIJUANA USE; HX OF H. PYLORI (treated)  - Started Protonix; Zofran ineffective, and starting Reglan  - 2 point drop in Hg but patient receiving IVF; Following CBC  - RUQ ultrasound negative for significant findings  - Patient educated of Marijuana use cessation  - Will continue home Amitriptyline  - Follow up GI recommendations  - NPO for now    ?RECURRENT COLITIS; HX OF C. DIFFICILE COLITIS  - CT A/P (04/13) @ [facility] suggested early colitis, per HPI; Report not in chart, however  - Hx of alternating diarrhea/constipation  - Ciprofloxacin and Flagyl started on admission; Discontinued with no fever or leukocytosis  - C. difficile/GI PCR if having diarrhea   - May need repeat CT if copy or [facility] CT cannot obtained    Electrolyte Imbalances: WNL      GI ppx:                                   [] Not indicated   /   [X] Pantoprazole 40mg IV BID    DVT ppx:  [X] Lovenox 40mg SubQ    Fluids:   [X] IVF    Activity:  [X] Ad Kristie  /  [X] Increase as Tolerated  /  [] OOB w/ assist  /  [] Bed Rest    BMI:  Height (cm): 154.94 (04-15)  Weight (kg): 65.4 (04-15)  BMI (kg/m2): 27.2 (04-15)    DISPO:  Patient to be discharged when condition(s) optimized.  [X] Home      [X] Discussion with patient and/or proxy regarding goals of care.  [X] Discussed Case and Plan with the Medical Attending.    CODE STATUS  [X] FULL     Please call Dr. Mcclelland [PGY-1] with any questions/consult recs: Spectra #[phone]

## 2019-04-16 NOTE — CHART NOTE - NSCHARTNOTEFT_GEN_A_CORE
PACU ANESTHESIA ADMISSION NOTE      Procedure:   Post op diagnosis:      ____  Intubated  TV:______       Rate: ______      FiO2: ______    _x___  Patent Airway    _x___  Full return of protective reflexes    _x___  Full recovery from anesthesia / back to baseline status    Vitals:  T(C): 36.6 (04-16-19 @ 13:31), Max: 36.8 (04-15-19 @ 22:58)  HR: 98 (04-16-19 @ 14:13) (70 - 102)  BP: 108/55 (04-16-19 @ 14:13) (98/57 - 133/77)  RR: 18 (04-16-19 @ 14:13) (16 - 18)  SpO2: 99% (04-16-19 @ 14:13) (99% - 100%)    Mental Status:  _x___ Awake   _____ Alert   _____ Drowsy   _____ Sedated    Nausea/Vomiting:  __x__ NO  ______Yes,   See Post - Op Orders          Pain Scale (0-10):  _____    Treatment: ____x None    ____ See Post - Op/PCA Orders    Post - Operative Fluids:   ____ Oral   ____ See Post - Op Orders    Plan: Discharge:   ____Home       ___x__Floor     _____Critical Care    _____  Other:_________________    Comments:

## 2019-04-16 NOTE — PRE-ANESTHESIA EVALUATION ADULT - MALLAMPATI CLASS
patient said she has a cracked tooth lower front/Class II - visualization of the soft palate, fauces, and uvula

## 2019-04-16 NOTE — CONSULT NOTE ADULT - SUBJECTIVE AND OBJECTIVE BOX
GI HPI:  34 y/o F with PMHx H pylori s/p clearance of infection after 2 y of treatment as per pt ,C diff colitis, IBS mixed  , had egd and colonoscopy done 4  yrs ago that showed the h pylori and gastritis, for which she was on protonix and got treated multiple times until eradication. Pt had been on protonix 2 yrs ago, but started to use marijuana instead for her sx, which noticed helped her. She smokes 3 joints/day for the past 2 yrs. Hx goes back to last friday  when she started experiencing nausea and vomiting, with streaks of blood , which .associated with  LLQ and mid lower abdominal pain, sharp in nature, episodic, lasting for 45min and then goes away for 10-15min, but comes back. She has also been experiencing midgastric burning sensation, which she attributes to gastritis. She does not notice any specific pattern with food intake, but she is not able to keep any food down on her own. She went to [facility] for this, where she had a ct scan done 4/13/19 that showed "Portions of the ascending colon and descending colon are partially collapsed. Slight thickening of the walls. This could represent an early colitis." She was d/c earlier yesterday  but she did not see a GI doctor while there. Pt denies any fevers, but she has been experiencing chills. Her bowel movements usually alternate between diarrhea and constipation, but recently it is leaning more towards constipation. Last BM was yesterday morning and it was hard small stool. Patient not passing gas since few days    Scope Hx:    Had 2 EGD and 2 colonoscopy in the past , last one 4 y ago that showed H Pylori gastritis and Colon inflammation as per patient. No reports available       PAST MEDICAL & SURGICAL HISTORY  H/O Clostridium difficile infection  History of marijuana use  Gastritis, Helicobacter pylori  Colitis  No significant past surgical history        SOCIAL HISTORY:  smoker: non smoker  Alcohol: Non alcoholic  Drug: use Marijuana daily   FAMILY HISTORY:  FAMILY HISTORY: denies any hx of IBD       ALLERGIES:  Bactrim (Unknown)  latex (Unknown)  penicillin (Unknown)  sulfa drugs (Unknown)      MEDICATIONS:  MEDICATIONS  (STANDING):  amitriptyline 50 milliGRAM(s) Oral at bedtime  chlorhexidine 4% Liquid 1 Application(s) Topical <User Schedule>  ciprofloxacin   IVPB 400 milliGRAM(s) IV Intermittent every 12 hours  enoxaparin Injectable 40 milliGRAM(s) SubCutaneous daily  lactated ringers. 1000 milliLiter(s) (100 mL/Hr) IV Continuous <Continuous>  metroNIDAZOLE  IVPB 500 milliGRAM(s) IV Intermittent every 8 hours  pantoprazole  Injectable 40 milliGRAM(s) IV Push two times a day    MEDICATIONS  (PRN):  metoclopramide Injectable 10 milliGRAM(s) IV Push every 8 hours PRN vomiting  morphine  - Injectable 4 milliGRAM(s) IV Push every 6 hours PRN Severe Pain (7 - 10)      HOME MEDICATIONS:  Home Medications:  amitriptyline 50 mg oral tablet: 1 tab(s) orally once a day (at bedtime) (15 Apr 2019 19:45)      ROS:     REVIEW OF SYSTEMS  General:  No fevers  Eyes:  No reported pain   ENT:  No sore throat   NECK: No stiffness   CV:  No chest pain   Resp:  No shortness of breath  GI:  See HPI  :  No dysuria  Muscle:  +  weakness  Neuro:  No tingling  Endocrine:  No polyuria  Heme:  No ecchymosis          VITALS:   T(F): 97.3 (04-16 @ 04:50), Max: 98.2 (04-15 @ 22:58)  HR: 70 (04-16 @ 04:50) (68 - 79)  BP: 103/62 (04-16 @ 04:50) (103/62 - 134/66)  BP(mean): --  RR: 18 (04-16 @ 04:50) (18 - 68)  SpO2: 99% (04-16 @ 07:41) (99% - 100%)    I&O's Summary    15 Apr 2019 07:01  -  16 Apr 2019 07:00  --------------------------------------------------------  IN: 300 mL / OUT: 0 mL / NET: 300 mL        PHYSICAL EXAM:  GENERAL:  Appears in no distress  HEENT:  Conjunctivae  anicteric  CHEST:  Full & symmetric excursion  HEART:  N S1, S2  ABDOMEN:  Soft, periumbilical and LLQ tender, non-distended, no masses   EXTREMITIES  no  edema  SKIN:  No rash  NEURO:  Alert        LABS:                        11.3   8.90  )-----------( 310      ( 16 Apr 2019 06:23 )             34.3       LIVER FUNCTIONS - ( 15 Apr 2019 16:01 )  Alb: 4.6 g/dL / Pro: 7.7 g/dL / ALK PHOS: 77 U/L / ALT: 21 U/L / AST: 25 U/L / GGT: x           04-15    135  |  99  |  5<L>  ----------------------------<  75  4.1   |  21  |  0.7    Ca    9.4      15 Apr 2019 16:01

## 2019-04-16 NOTE — PATIENT PROFILE ADULT - HAVE YOU EXPERIENCED VIOLENCE OR A TRAUMATIC EVENT?
Is This A New Presentation, Or A Follow-Up?: Skin Lesion Has Your Skin Lesion Been Treated?: not been treated no

## 2019-04-16 NOTE — PRE-OP CHECKLIST - BP NONINVASIVE SYSTOLIC (MM HG)
Oriented - self; Oriented - place; Oriented - time/Age appropriate behavior/Recognizes caregiver
111

## 2019-04-17 ENCOUNTER — TRANSCRIPTION ENCOUNTER (OUTPATIENT)
Age: 34
End: 2019-04-17

## 2019-04-17 ENCOUNTER — EMERGENCY (EMERGENCY)
Facility: HOSPITAL | Age: 34
LOS: 0 days | Discharge: HOME | End: 2019-04-18
Attending: EMERGENCY MEDICINE | Admitting: EMERGENCY MEDICINE
Payer: COMMERCIAL

## 2019-04-17 VITALS
SYSTOLIC BLOOD PRESSURE: 124 MMHG | DIASTOLIC BLOOD PRESSURE: 72 MMHG | HEART RATE: 84 BPM | RESPIRATION RATE: 18 BRPM | TEMPERATURE: 98 F

## 2019-04-17 VITALS
OXYGEN SATURATION: 99 % | HEART RATE: 76 BPM | TEMPERATURE: 97 F | DIASTOLIC BLOOD PRESSURE: 70 MMHG | WEIGHT: 134.92 LBS | RESPIRATION RATE: 18 BRPM | SYSTOLIC BLOOD PRESSURE: 116 MMHG

## 2019-04-17 DIAGNOSIS — Z88.0 ALLERGY STATUS TO PENICILLIN: ICD-10-CM

## 2019-04-17 DIAGNOSIS — R10.9 UNSPECIFIED ABDOMINAL PAIN: ICD-10-CM

## 2019-04-17 DIAGNOSIS — R10.12 LEFT UPPER QUADRANT PAIN: ICD-10-CM

## 2019-04-17 DIAGNOSIS — Z79.899 OTHER LONG TERM (CURRENT) DRUG THERAPY: ICD-10-CM

## 2019-04-17 DIAGNOSIS — Z88.2 ALLERGY STATUS TO SULFONAMIDES: ICD-10-CM

## 2019-04-17 DIAGNOSIS — F17.200 NICOTINE DEPENDENCE, UNSPECIFIED, UNCOMPLICATED: ICD-10-CM

## 2019-04-17 DIAGNOSIS — N83.209 UNSPECIFIED OVARIAN CYST, UNSPECIFIED SIDE: ICD-10-CM

## 2019-04-17 LAB
ANION GAP SERPL CALC-SCNC: 14 MMOL/L — SIGNIFICANT CHANGE UP (ref 7–14)
BASOPHILS # BLD AUTO: 0.02 K/UL — SIGNIFICANT CHANGE UP (ref 0–0.2)
BASOPHILS NFR BLD AUTO: 0.2 % — SIGNIFICANT CHANGE UP (ref 0–1)
BUN SERPL-MCNC: 9 MG/DL — LOW (ref 10–20)
CALCIUM SERPL-MCNC: 8.7 MG/DL — SIGNIFICANT CHANGE UP (ref 8.5–10.1)
CHLORIDE SERPL-SCNC: 102 MMOL/L — SIGNIFICANT CHANGE UP (ref 98–110)
CO2 SERPL-SCNC: 22 MMOL/L — SIGNIFICANT CHANGE UP (ref 17–32)
CREAT SERPL-MCNC: 0.6 MG/DL — LOW (ref 0.7–1.5)
EOSINOPHIL # BLD AUTO: 0.05 K/UL — SIGNIFICANT CHANGE UP (ref 0–0.7)
EOSINOPHIL NFR BLD AUTO: 0.5 % — SIGNIFICANT CHANGE UP (ref 0–8)
GLUCOSE SERPL-MCNC: 71 MG/DL — SIGNIFICANT CHANGE UP (ref 70–99)
HCT VFR BLD CALC: 34.8 % — LOW (ref 37–47)
HGB BLD-MCNC: 11.5 G/DL — LOW (ref 12–16)
IMM GRANULOCYTES NFR BLD AUTO: 0.3 % — SIGNIFICANT CHANGE UP (ref 0.1–0.3)
LYMPHOCYTES # BLD AUTO: 2.08 K/UL — SIGNIFICANT CHANGE UP (ref 1.2–3.4)
LYMPHOCYTES # BLD AUTO: 21.6 % — SIGNIFICANT CHANGE UP (ref 20.5–51.1)
MCHC RBC-ENTMCNC: 26.2 PG — LOW (ref 27–31)
MCHC RBC-ENTMCNC: 33 G/DL — SIGNIFICANT CHANGE UP (ref 32–37)
MCV RBC AUTO: 79.3 FL — LOW (ref 81–99)
MONOCYTES # BLD AUTO: 0.5 K/UL — SIGNIFICANT CHANGE UP (ref 0.1–0.6)
MONOCYTES NFR BLD AUTO: 5.2 % — SIGNIFICANT CHANGE UP (ref 1.7–9.3)
NEUTROPHILS # BLD AUTO: 6.97 K/UL — HIGH (ref 1.4–6.5)
NEUTROPHILS NFR BLD AUTO: 72.2 % — SIGNIFICANT CHANGE UP (ref 42.2–75.2)
NRBC # BLD: 0 /100 WBCS — SIGNIFICANT CHANGE UP (ref 0–0)
PLATELET # BLD AUTO: 307 K/UL — SIGNIFICANT CHANGE UP (ref 130–400)
POTASSIUM SERPL-MCNC: 4.2 MMOL/L — SIGNIFICANT CHANGE UP (ref 3.5–5)
POTASSIUM SERPL-SCNC: 4.2 MMOL/L — SIGNIFICANT CHANGE UP (ref 3.5–5)
RBC # BLD: 4.39 M/UL — SIGNIFICANT CHANGE UP (ref 4.2–5.4)
RBC # FLD: 13.4 % — SIGNIFICANT CHANGE UP (ref 11.5–14.5)
SODIUM SERPL-SCNC: 138 MMOL/L — SIGNIFICANT CHANGE UP (ref 135–146)
SURGICAL PATHOLOGY STUDY: SIGNIFICANT CHANGE UP
WBC # BLD: 9.65 K/UL — SIGNIFICANT CHANGE UP (ref 4.8–10.8)
WBC # FLD AUTO: 9.65 K/UL — SIGNIFICANT CHANGE UP (ref 4.8–10.8)

## 2019-04-17 PROCEDURE — 99285 EMERGENCY DEPT VISIT HI MDM: CPT | Mod: 25

## 2019-04-17 RX ORDER — ALPRAZOLAM 0.25 MG
0.5 TABLET ORAL EVERY 6 HOURS
Qty: 0 | Refills: 0 | Status: DISCONTINUED | OUTPATIENT
Start: 2019-04-17 | End: 2019-04-17

## 2019-04-17 RX ORDER — PROCHLORPERAZINE MALEATE 5 MG
10 TABLET ORAL EVERY 6 HOURS
Qty: 0 | Refills: 0 | Status: DISCONTINUED | OUTPATIENT
Start: 2019-04-17 | End: 2019-04-17

## 2019-04-17 RX ORDER — METOCLOPRAMIDE HCL 10 MG
5 TABLET ORAL EVERY 6 HOURS
Qty: 0 | Refills: 0 | Status: DISCONTINUED | OUTPATIENT
Start: 2019-04-17 | End: 2019-04-17

## 2019-04-17 RX ORDER — PANTOPRAZOLE SODIUM 20 MG/1
1 TABLET, DELAYED RELEASE ORAL
Qty: 60 | Refills: 0
Start: 2019-04-17 | End: 2019-05-16

## 2019-04-17 RX ORDER — METOCLOPRAMIDE HCL 10 MG
1 TABLET ORAL
Qty: 10 | Refills: 0
Start: 2019-04-17

## 2019-04-17 RX ADMIN — Medication 10 MILLIGRAM(S): at 15:20

## 2019-04-17 RX ADMIN — MORPHINE SULFATE 4 MILLIGRAM(S): 50 CAPSULE, EXTENDED RELEASE ORAL at 02:01

## 2019-04-17 RX ADMIN — Medication 30 MILLILITER(S): at 09:20

## 2019-04-17 RX ADMIN — Medication 5 MILLIGRAM(S): at 09:21

## 2019-04-17 RX ADMIN — Medication 0.5 MILLIGRAM(S): at 15:20

## 2019-04-17 RX ADMIN — PANTOPRAZOLE SODIUM 40 MILLIGRAM(S): 20 TABLET, DELAYED RELEASE ORAL at 06:29

## 2019-04-17 NOTE — PROGRESS NOTE ADULT - ASSESSMENT
34 y/o F with PMHx H pylori s/p clearance of infection after 2 y of treatment as per pt ,C diff colitis, IBS mixed  , had egd and colonoscopy done 4  yrs ago that showed the h pylori and gastritis, for which she was on protonix and got treated multiple times until eradication. Pt had been on protonix 2 yrs ago, but started to use marijuana instead for her sx, which noticed helped her. She smokes 3 joints/day for the past 2 yrs. Hx goes back to last friday  when she started experiencing nausea and vomiting, with streaks of blood , which .associated with  LLQ and mid lower abdominal pain, sharp in nature, episodic, lasting for 45min and then goes away for 10-15min, but comes back. She has also been experiencing midgastric burning sensation, which she attributes to gastritis. She does not notice any specific pattern with food intake, but she is not able to keep any food down on her own. She went to [facility] for this, where she had a ct scan done 4/13/19 that showed "Portions of the ascending colon and descending colon are partially collapsed. Slight thickening of the walls. This could represent an early colitis." She was d/c earlier yesterday  but she did not see a GI doctor while there. Pt denies any fevers, but she has been experiencing chills. Her bowel movements usually alternate between diarrhea and constipation, but recently it is leaning more towards constipation. Last BM was yesterday morning and it was hard small stool. Patient not passing gas since few days    Scope Hx:    Had 2 EGD and 2 colonoscopy in the past , last one 4 y ago that showed H Pylori gastritis and Colon inflammation as per patient. No reports available     # Abdominal pain/ Nausea vomiting/ reflux resolved   Rule out Gastroenteritis   Follow pathology results  Follow up GI as outpatient for colonoscopy   Can discharge home on PPI BID

## 2019-04-17 NOTE — DISCHARGE NOTE PROVIDER - NSDCCPCAREPLAN_GEN_ALL_CORE_FT
PRINCIPAL DISCHARGE DIAGNOSIS  Diagnosis: Nonerosive nonspecific gastritis  Assessment and Plan of Treatment: EGD during the admission showed nonerosive gastritis. Please continue to take the prescribed medications and follow up with GI specialist shortly after discharge. Please follow the diet instructions provided to you upon discharge to prevent worsening symptoms. Please follow up with the pathology reports from your EGD.      SECONDARY DISCHARGE DIAGNOSES  Diagnosis: Non-intractable vomiting with nausea, unspecified vomiting type  Assessment and Plan of Treatment: Your vomiting may have been related to marijuana use. Please refrain from using marijuana and follow up with GI and your primary care doctor shortly after discharge. You may take Reglan on an as needed basis, which was sent to your pharmacy.

## 2019-04-17 NOTE — PROGRESS NOTE ADULT - REASON FOR ADMISSION
recurrent colitis, intractable vomiting
Abdominal pain  Nausea/Vomiting
recurrent colitis, intractable vomiting

## 2019-04-17 NOTE — DISCHARGE NOTE PROVIDER - PROVIDER TOKENS
FREE:[LAST:[Dr. Medina],FIRST:[Shelby Hernandez],PHONE:[phone]],FAX:[(   )    -],ADDRESS:[[address] [place] [address]]],FREE:[LAST:[Yves],FIRST:[Wong],PHONE:[phone]:[(   )    -],ADDRESS:[[address] [place] [address]]]

## 2019-04-17 NOTE — DISCHARGE NOTE NURSING/CASE MANAGEMENT/SOCIAL WORK - NSDCDPATPORTLINK_GEN_ALL_CORE
You can access the [facility] Patient Portal, offered by [facility], by registering with the following website: [email or web address]

## 2019-04-17 NOTE — PROGRESS NOTE ADULT - SUBJECTIVE AND OBJECTIVE BOX
DAILY PROGRESS NOTE  ===========================================================    Patient Information:  SARAHY MAN  /  33y  /  Female  /  MRN#: [id number]    Hospital Day: 1d     |:::::::::::::::::::::::::::| SUBJECTIVE |:::::::::::::::::::::::::::|    OVERNIGHT EVENTS: 2 episodes of emesis per patient, not blood streaked.  TODAY: Patient was seen today at bedside. She still reports nausea and vomiting tht no longer has blood in it; still taking Zofran with minimal effect. She also has continued abdominal pain in the lower quadrants and well as some epigastric and RUQ discomfort. She reports she brought in her CT A/P results with her. Review of systems is otherwise negative.    |:::::::::::::::::::::::::::| OBJECTIVE |:::::::::::::::::::::::::::|    VITAL SIGNS: Last 24 Hours  T(F): 97.3 (16 Apr 2019 04:50), Max: 98.2 (15 Apr 2019 22:58)  HR: 70 (16 Apr 2019 04:50) (68 - 79)  BP: 103/62 (16 Apr 2019 04:50) (103/62 - 134/66)  RR: 18 (16 Apr 2019 04:50) (18 - 68)  SpO2: 99% (16 Apr 2019 07:41) (99% - 100%)    04-15-19 @ 07:01  -  04-16-19 @ 07:00  --------------------------------------------------------  IN: 300 mL / OUT: 0 mL / NET: 300 mL    PHYSICAL EXAM:  GENERAL:   Awake, alert; NAD.  HEENT:  Head NC/AT; Conjunctivae pink, Sclera anicteric; Oral mucosa moist.  CARDIO:   Regular rate; Regular rhythm; S1 & S2.  RESP:   No rales or rhonchi appreciated.  GI:   Soft; ND; Tender in lower quadrants, mild discomfort on epigastrum and RUQ; RUQ pain increased when palpated with inspiration; BS; No guarding; No rebound tenderness.  EXT:   No edema in UE and LE.  NEURO:   PERRL.  SKIN:   Intact.    LAB RESULTS:                        11.3   8.90  )-----------( 310      ( 16 Apr 2019 06:23 )             34.3     139  |  104  |  8<L>  ----------------------<  54<L>  3.9   |  18  |  0.7    Ca    8.6      16 Apr 2019 06:23    TPro  5.8<L>  /  Alb  3.6  /  TBili  0.5  /  DBili  x   /  AST  27  /  ALT  16  /  AlkPhos  60     MICROBIOLOGY:  None    RADIOLOGY:  Pending RUQ ultrasound    ALLERGIES:  Bactrim (Unknown)  latex (Unknown)  penicillin (Unknown)  sulfa drugs (Unknown)    HOME MEDICATIONS:  amitriptyline 50 mg oral tablet: 1 tab(s) orally once a day (at bedtime) (15 Apr 2019 19:45)    ===========================================================
SARAHY MAN  33y  Female  ***My note supersedes ALL resident notes that I sign.  My corrections for their notes are in my note.***    I can be reached directly on [employer] [id number]. My office number is [phone]. My personal cell number is [phone].    INTERVAL EVENTS: Here for f/u of nausea. No vomit x24 hrs. Pt said xanax did help for 30 min at 0.25mg dose. Pt has residula nausea and some abd pain, but does feel like trying to eat again. She is off IVFs. Pt not sure if she wants to go home today or tomorrow.    T(F): 98.3 (04-17-19 @ 14:06), Max: 98.3 (04-17-19 @ 14:06)  HR: 84 (04-17-19 @ 14:06) (62 - 122)  BP: 124/73 (04-17-19 @ 14:06) (95/53 - 150/89)  RR: 18 (04-17-19 @ 14:06) (17 - 18)  SpO2: 99% (04-16-19 @ 23:58) (99% - 100%)    GENERAL:   Awake, alert; NAD.  HEENT:  Head NC/AT; Conjunctivae pink, Sclera anicteric; Oral mucosa moist.  CARDIO:   Regular rate; Regular rhythm; S1 & S2.  RESP:   No rales or rhonchi appreciated.  GI:   Soft; ND; mild tenderness all over  EXT:   No edema in UE and LE.  SKIN: no rash    LABS:                        11.5    (    79.3   9.65  )-----------( ---------      307      ( 17 Apr 2019 05:30 )             34.8    (    13.4     138   (   102   (   71      04-17-19 @ 05:30  ----------------------               4.2   (   22   (   9                             -----                        0.6  Ca  8.7   Mg  --    P   --     PT/INR - ( 16 Apr 2019 11:53 )   PT: 13.20 sec;   INR: 1.15 ratio    PTT - ( 16 Apr 2019 11:53 )  PTT:36.5 sec    Surgical Pathology Report (04.16.19 @ 14:15)    Surgical Pathology Report:   ACCESSION No:  [id number]    SARAHY MAN                     1        Surgical Final Report          Final Diagnosis  1. Duodenum, biopsies:  - Duodenal mucosa without significant diagnostic abnormalities.  - No diagnostic features of celiac disease or Giardia lamblia  organisms seen.    2. Stomach, biopsies:  - Focal mild chronic non-specific gastritis.  - No active gastritis seen.  - Giemsa stain fails to reveal Helicobacter pylori in this  material.    Verified by: Tamia Manzano M.D.  (Electronic Signature)  Reported on: 04/17/19 13:12 EDT, [address] [place] [id number]  _________________________________________________________________      RADIOLOGY & ADDITIONAL TESTS:  < from: EGD (04.16.19 @ 14:00) >  Impressions:    Tortuosity of the esophageal lumen was noted, consistent with presbyesophagus.  .    Erythema in the stomach compatible with non-erosive gastritis. (Biopsy).    Normal mucosa in the whole examined duodenum. (Biopsy).     Plan: Await pathology results  Advance diet as tolerated  Protonix 40 mg po bid    < end of copied text >    < from: US Abdomen Limited (04.15.19 @ 20:56) >  IMPRESSION:    Unremarkable right upper quadrant ultrasound.    < end of copied text >    MEDICATIONS:    ALPRAZolam 0.5 milliGRAM(s) Oral every 6 hours PRN  aluminum hydroxide/magnesium hydroxide/simethicone Suspension 30 milliLiter(s) Oral every 6 hours PRN  amitriptyline 50 milliGRAM(s) Oral at bedtime  chlorhexidine 4% Liquid 1 Application(s) Topical <User Schedule>  enoxaparin Injectable 40 milliGRAM(s) SubCutaneous daily  pantoprazole    Tablet 40 milliGRAM(s) Oral every 12 hours  prochlorperazine   IVPB 10 milliGRAM(s) IV Intermittent every 6 hours PRN
We are following the patient for abd pain and vomiting     GI HPI Today:  No complaints  Patient feels better, tolerated diet   No vomiting   No diet     PAST MEDICAL & SURGICAL HISTORY  H/O Clostridium difficile infection  History of marijuana use  Gastritis, Helicobacter pylori  Colitis  No significant past surgical history      ALLERGIES:  Bactrim (Unknown)  latex (Unknown)  penicillin (Unknown)  sulfa drugs (Unknown)      MEDICATIONS:  MEDICATIONS  (STANDING):  amitriptyline 50 milliGRAM(s) Oral at bedtime  chlorhexidine 4% Liquid 1 Application(s) Topical <User Schedule>  dextrose 5%. 1000 milliLiter(s) (100 mL/Hr) IV Continuous <Continuous>  enoxaparin Injectable 40 milliGRAM(s) SubCutaneous daily  pantoprazole    Tablet 40 milliGRAM(s) Oral every 12 hours    MEDICATIONS  (PRN):  ALPRAZolam 0.25 milliGRAM(s) Oral every 8 hours PRN Hyperemesis  metoclopramide 5 milliGRAM(s) Oral every 6 hours PRN nausea/ vomiting      REVIEW OF SYSTEMS  General:  No fevers  Eyes:  No reported pain   ENT:  No sore throat   NECK: No stiffness   CV:  No chest pain   Resp:  No shortness of breath  GI:  See HPI  :  No dysuria  Muscle:  No weakness  Neuro:  No tingling  Endocrine:  No polyuria  Heme:  No ecchymosis        VITALS:   T(F): 97.2 (04-17 @ 05:35), Max: 98.2 (04-15 @ 22:58)  HR: 62 (04-17 @ 05:35) (62 - 122)  BP: 95/53 (04-17 @ 05:35) (95/53 - 150/89)  BP(mean): --  RR: 17 (04-17 @ 05:35) (16 - 68)  SpO2: 99% (04-16 @ 23:58) (99% - 100%)        PHYSICAL EXAM:  GENERAL:  Appears in no distress  HEENT:  Conjunctivae Anicteric   CHEST:  Full & symmetric excursion  HEART:  NS1, S2,   ABDOMEN:  Soft, mild lower abd tender, non-distended, normoactive bowel sounds,  no masses   EXTEREMITIES:  no edema  SKIN:  No rash  NEURO:  Alert         Blood Work :                        11.5   9.65  )-----------( 307      ( 17 Apr 2019 05:30 )             34.8     PT/INR - ( 16 Apr 2019 11:53 )  INR: 1.15          PTT - ( 16 Apr 2019 11:53 )  PTT:36.5   04-17    138  |  102  |  9<L>  ----------------------------<  71  4.2   |  22  |  0.6<L>    Ca    8.7      17 Apr 2019 05:30      CBC -  ( 17 Apr 2019 05:30 )  Hemoglobin : 11.5    CBC -  ( 16 Apr 2019 17:23 )  Hemoglobin : 12.7    CBC -  ( 16 Apr 2019 06:23 )  Hemoglobin : 11.3    CBC -  ( 15 Apr 2019 16:01 )  Hemoglobin : 13.3      LIVER FUNCTIONS - ( 16 Apr 2019 06:23 )  Alb: 3.6 [3.5 - 5.2] / Pro: 5.8 [6.0 - 8.0] / ALK PHOS: 60 [30 - 115] / ALT: 16 [0 - 41] / AST: 27 [0 - 41] / GGT: x     LIVER FUNCTIONS - ( 15 Apr 2019 16:01 )  Alb: 4.6 [3.5 - 5.2] / Pro: 7.7 [6.0 - 8.0] / ALK PHOS: 77 [30 - 115] / ALT: 21 [0 - 41] / AST: 25 [0 - 41] / GGT: x

## 2019-04-17 NOTE — PROGRESS NOTE ADULT - ASSESSMENT
# cannabinoid hyperemesis syndrome  CT scan from [facility] 4/13/19 shows concern for "early colitis" - this is just underdistention of colon   RUQ sono neg  d/c cipro and flagyl - doubt colitis  d/c morphine  d/c toradol  d/c IVFs  zofran and reglan not helping, try compazine 10mg iv q6 prn n/v  try xanax 0.5mg po q6 prn n/v or anxiety - pt should not need this at home  diet: as tolerated  GI might do c-scope,as outpt - f/u in clinic  pt has been educated to stop w/ cannaboid use, c/w amitriptyline  no dysuria, so would not check u/a  pregnancy neg  PPI po q12    # non-erosive gastritis  PPI  path shows no H pylori    # presbyespophagus  no further tx    # DVT PPX: lovenox sq    Dispo: once pt tolerating food and water, she can go home; possibly tonight or certainly by tomorrow (or appeal) - anticipate d/c

## 2019-04-17 NOTE — DISCHARGE NOTE PROVIDER - NSDCCPTREATMENT_GEN_ALL_CORE_FT
PRINCIPAL PROCEDURE  Procedure: EGD  Findings and Treatment: PERFORMED ON 4/16/2019  Impressions:    Tortuosity of the esophageal lumen was noted, consistent with presbyesophagus.     Erythema in the stomach compatible with non-erosive gastritis. (Biopsy).   Normal mucosa in the whole examined duodenum. (Biopsy).

## 2019-04-17 NOTE — DISCHARGE NOTE PROVIDER - HOSPITAL COURSE
This is a 33 year old female presenting with a chief complaint of nausea/vomiting x3 weeks. Three days prior to this presentation, however, she developed LLQ and mid-lower abdominal pain that she described as sharp and intermittent in nature. She also complains of epigastric pain that she describes as burning/discomfort. She does not attribute worsening of her symptoms with food intake, but feels she cannot keep her food down. She initially went to [facility] and on 04/13 a CT was done and showed "Portions of the ascending colon and descending colon are partially collapsed. Slight thickening of the walls. This could represent an early colitis." She was later discharged. Of note, she had an EGD performed 2 years ago that revealed gastritis; previously was on Protonix but stopped as she found symptomatic relief with Marijuana use (smoking ~3 "joints" per day x2 yrs). EGD was performed and showed nonerosive gastritis. She is being discharged with GI follow up and primary care follow up.         EMESIS WITH NAUSEA/VOMITING; POSSIBLY DUE GASTRITIS vs. EXCESSIVE MARIJUANA USE; HX OF H. PYLORI (treated)    - Started Protonix; Zofran ineffective; Reglan effective    - RUQ ultrasound negative for significant findings    - EGD showed gastritis (nonerosive)    - Patient educated of Marijuana use cessation    - Will continue home Amitriptyline    - Follow up GI as OP        ?RECURRENT COLITIS; HX OF C. DIFFICILE COLITIS    - CT A/P (04/13) @ [facility] suggested early colitis    - Hx of alternating diarrhea/constipation    - Ciprofloxacin and Flagyl started on admission; Discontinued with no fever or leukocytosis        =====================    EGD (04.16.19 @ 14:00)         Impressions:      Tortuosity of the esophageal lumen was noted, consistent with presbyesophagus.     Erythema in the stomach compatible with non-erosive gastritis. (Biopsy).      Normal mucosa in the whole examined duodenum. (Biopsy).

## 2019-04-18 VITALS
DIASTOLIC BLOOD PRESSURE: 77 MMHG | RESPIRATION RATE: 18 BRPM | TEMPERATURE: 98 F | OXYGEN SATURATION: 100 % | SYSTOLIC BLOOD PRESSURE: 111 MMHG | HEART RATE: 72 BPM

## 2019-04-18 PROBLEM — K52.9 NONINFECTIVE GASTROENTERITIS AND COLITIS, UNSPECIFIED: Chronic | Status: ACTIVE | Noted: 2019-04-15

## 2019-04-18 PROBLEM — K29.70 GASTRITIS, UNSPECIFIED, WITHOUT BLEEDING: Chronic | Status: ACTIVE | Noted: 2019-04-15

## 2019-04-18 PROBLEM — Z87.898 PERSONAL HISTORY OF OTHER SPECIFIED CONDITIONS: Chronic | Status: ACTIVE | Noted: 2019-04-15

## 2019-04-18 PROBLEM — Z86.19 PERSONAL HISTORY OF OTHER INFECTIOUS AND PARASITIC DISEASES: Chronic | Status: ACTIVE | Noted: 2019-04-15

## 2019-04-18 LAB
ALBUMIN SERPL ELPH-MCNC: 4 G/DL — SIGNIFICANT CHANGE UP (ref 3.5–5.2)
ALP SERPL-CCNC: 50 U/L — SIGNIFICANT CHANGE UP (ref 30–115)
ALT FLD-CCNC: 64 U/L — HIGH (ref 0–41)
ANION GAP SERPL CALC-SCNC: 16 MMOL/L — HIGH (ref 7–14)
APPEARANCE UR: ABNORMAL
AST SERPL-CCNC: 104 U/L — HIGH (ref 0–41)
BASOPHILS # BLD AUTO: 0.04 K/UL — SIGNIFICANT CHANGE UP (ref 0–0.2)
BASOPHILS NFR BLD AUTO: 0.5 % — SIGNIFICANT CHANGE UP (ref 0–1)
BILIRUB SERPL-MCNC: 0.3 MG/DL — SIGNIFICANT CHANGE UP (ref 0.2–1.2)
BILIRUB UR-MCNC: NEGATIVE — SIGNIFICANT CHANGE UP
BUN SERPL-MCNC: 7 MG/DL — LOW (ref 10–20)
CALCIUM SERPL-MCNC: 8.4 MG/DL — LOW (ref 8.5–10.1)
CHLORIDE SERPL-SCNC: 101 MMOL/L — SIGNIFICANT CHANGE UP (ref 98–110)
CO2 SERPL-SCNC: 22 MMOL/L — SIGNIFICANT CHANGE UP (ref 17–32)
COLOR SPEC: YELLOW — SIGNIFICANT CHANGE UP
CREAT SERPL-MCNC: 0.6 MG/DL — LOW (ref 0.7–1.5)
DIFF PNL FLD: NEGATIVE — SIGNIFICANT CHANGE UP
EOSINOPHIL # BLD AUTO: 0.13 K/UL — SIGNIFICANT CHANGE UP (ref 0–0.7)
EOSINOPHIL NFR BLD AUTO: 1.6 % — SIGNIFICANT CHANGE UP (ref 0–8)
EPI CELLS # UR: ABNORMAL /HPF
GLUCOSE SERPL-MCNC: 66 MG/DL — LOW (ref 70–99)
GLUCOSE UR QL: NEGATIVE MG/DL — SIGNIFICANT CHANGE UP
HCG SERPL QL: NEGATIVE — SIGNIFICANT CHANGE UP
HCT VFR BLD CALC: 36.6 % — LOW (ref 37–47)
HGB BLD-MCNC: 12 G/DL — SIGNIFICANT CHANGE UP (ref 12–16)
IMM GRANULOCYTES NFR BLD AUTO: 0.2 % — SIGNIFICANT CHANGE UP (ref 0.1–0.3)
KETONES UR-MCNC: 40
LACTATE SERPL-SCNC: 1.1 MMOL/L — SIGNIFICANT CHANGE UP (ref 0.5–2.2)
LEUKOCYTE ESTERASE UR-ACNC: ABNORMAL
LIDOCAIN IGE QN: 20 U/L — SIGNIFICANT CHANGE UP (ref 7–60)
LYMPHOCYTES # BLD AUTO: 3.05 K/UL — SIGNIFICANT CHANGE UP (ref 1.2–3.4)
LYMPHOCYTES # BLD AUTO: 37.8 % — SIGNIFICANT CHANGE UP (ref 20.5–51.1)
MCHC RBC-ENTMCNC: 25.9 PG — LOW (ref 27–31)
MCHC RBC-ENTMCNC: 32.8 G/DL — SIGNIFICANT CHANGE UP (ref 32–37)
MCV RBC AUTO: 78.9 FL — LOW (ref 81–99)
MONOCYTES # BLD AUTO: 0.54 K/UL — SIGNIFICANT CHANGE UP (ref 0.1–0.6)
MONOCYTES NFR BLD AUTO: 6.7 % — SIGNIFICANT CHANGE UP (ref 1.7–9.3)
NEUTROPHILS # BLD AUTO: 4.29 K/UL — SIGNIFICANT CHANGE UP (ref 1.4–6.5)
NEUTROPHILS NFR BLD AUTO: 53.2 % — SIGNIFICANT CHANGE UP (ref 42.2–75.2)
NITRITE UR-MCNC: NEGATIVE — SIGNIFICANT CHANGE UP
NRBC # BLD: 0 /100 WBCS — SIGNIFICANT CHANGE UP (ref 0–0)
PH UR: 7 — SIGNIFICANT CHANGE UP (ref 5–8)
PLATELET # BLD AUTO: 342 K/UL — SIGNIFICANT CHANGE UP (ref 130–400)
POTASSIUM SERPL-MCNC: 4.7 MMOL/L — SIGNIFICANT CHANGE UP (ref 3.5–5)
POTASSIUM SERPL-SCNC: 4.7 MMOL/L — SIGNIFICANT CHANGE UP (ref 3.5–5)
PROT SERPL-MCNC: 6.4 G/DL — SIGNIFICANT CHANGE UP (ref 6–8)
PROT UR-MCNC: NEGATIVE MG/DL — SIGNIFICANT CHANGE UP
RBC # BLD: 4.64 M/UL — SIGNIFICANT CHANGE UP (ref 4.2–5.4)
RBC # FLD: 13.5 % — SIGNIFICANT CHANGE UP (ref 11.5–14.5)
RBC CASTS # UR COMP ASSIST: SIGNIFICANT CHANGE UP /HPF
SODIUM SERPL-SCNC: 139 MMOL/L — SIGNIFICANT CHANGE UP (ref 135–146)
SP GR SPEC: 1.01 — SIGNIFICANT CHANGE UP (ref 1.01–1.03)
UROBILINOGEN FLD QL: 0.2 MG/DL — SIGNIFICANT CHANGE UP (ref 0.2–0.2)
WBC # BLD: 8.07 K/UL — SIGNIFICANT CHANGE UP (ref 4.8–10.8)
WBC # FLD AUTO: 8.07 K/UL — SIGNIFICANT CHANGE UP (ref 4.8–10.8)
WBC UR QL: ABNORMAL /HPF

## 2019-04-18 PROCEDURE — 74177 CT ABD & PELVIS W/CONTRAST: CPT | Mod: 26

## 2019-04-18 PROCEDURE — 76856 US EXAM PELVIC COMPLETE: CPT | Mod: 26

## 2019-04-18 RX ORDER — MORPHINE SULFATE 50 MG/1
4 CAPSULE, EXTENDED RELEASE ORAL ONCE
Qty: 0 | Refills: 0 | Status: DISCONTINUED | OUTPATIENT
Start: 2019-04-18 | End: 2019-04-18

## 2019-04-18 RX ORDER — ONDANSETRON 8 MG/1
4 TABLET, FILM COATED ORAL ONCE
Qty: 0 | Refills: 0 | Status: COMPLETED | OUTPATIENT
Start: 2019-04-18 | End: 2019-04-18

## 2019-04-18 RX ORDER — FAMOTIDINE 10 MG/ML
20 INJECTION INTRAVENOUS ONCE
Qty: 0 | Refills: 0 | Status: COMPLETED | OUTPATIENT
Start: 2019-04-18 | End: 2019-04-18

## 2019-04-18 RX ORDER — SODIUM CHLORIDE 9 MG/ML
3 INJECTION INTRAMUSCULAR; INTRAVENOUS; SUBCUTANEOUS ONCE
Qty: 0 | Refills: 0 | Status: COMPLETED | OUTPATIENT
Start: 2019-04-18 | End: 2019-04-18

## 2019-04-18 RX ORDER — SODIUM CHLORIDE 9 MG/ML
1000 INJECTION INTRAMUSCULAR; INTRAVENOUS; SUBCUTANEOUS ONCE
Qty: 0 | Refills: 0 | Status: COMPLETED | OUTPATIENT
Start: 2019-04-18 | End: 2019-04-18

## 2019-04-18 RX ADMIN — MORPHINE SULFATE 4 MILLIGRAM(S): 50 CAPSULE, EXTENDED RELEASE ORAL at 05:35

## 2019-04-18 RX ADMIN — ONDANSETRON 4 MILLIGRAM(S): 8 TABLET, FILM COATED ORAL at 01:40

## 2019-04-18 RX ADMIN — SODIUM CHLORIDE 3 MILLILITER(S): 9 INJECTION INTRAMUSCULAR; INTRAVENOUS; SUBCUTANEOUS at 01:16

## 2019-04-18 RX ADMIN — MORPHINE SULFATE 4 MILLIGRAM(S): 50 CAPSULE, EXTENDED RELEASE ORAL at 03:38

## 2019-04-18 RX ADMIN — FAMOTIDINE 20 MILLIGRAM(S): 10 INJECTION INTRAVENOUS at 01:41

## 2019-04-18 RX ADMIN — MORPHINE SULFATE 4 MILLIGRAM(S): 50 CAPSULE, EXTENDED RELEASE ORAL at 03:23

## 2019-04-18 RX ADMIN — SODIUM CHLORIDE 1000 MILLILITER(S): 9 INJECTION INTRAMUSCULAR; INTRAVENOUS; SUBCUTANEOUS at 01:41

## 2019-04-18 RX ADMIN — SODIUM CHLORIDE 1000 MILLILITER(S): 9 INJECTION INTRAMUSCULAR; INTRAVENOUS; SUBCUTANEOUS at 03:23

## 2019-04-18 NOTE — ED ADULT NURSE NOTE - NSIMPLEMENTINTERV_GEN_ALL_ED
Implemented All Universal Safety Interventions:  [place] to call system. Call bell, personal items and telephone within reach. Instruct patient to call for assistance. Room bathroom lighting operational. Non-slip footwear when patient is off stretcher. Physically safe environment: no spills, clutter or unnecessary equipment. Stretcher in lowest position, wheels locked, appropriate side rails in place.

## 2019-04-18 NOTE — ED PROVIDER NOTE - OBJECTIVE STATEMENT
33y F w/ PMH of recurrent colitis, H.pylori gastritis, IBS, and C.diff presents with abdominal pain. Pt was discharged earlier today after being admitted for her intractable n/v and abd pain for the past 6 days. Since being discharged, pt had continual symptoms and was unable to pick her meds from pharmacy. Had one additional episode of nbnb emesis. Denies fever, cough, CP, SOB, or diarrhea.

## 2019-04-18 NOTE — ED PROVIDER NOTE - NSFOLLOWUPCLINICS_GEN_ALL_ED_FT
[facility]  Medicine  [address], [place]   Phone: [phone]  Fax:   Follow Up Time: 4-6 Days    [facility]  OB/GYN  [address] [place] [address]  Phone: [phone]  Fax:   Follow Up Time: 4-6 Days

## 2019-04-18 NOTE — ED PROVIDER NOTE - ATTENDING CONTRIBUTION TO CARE
I personally evaluated the patient. I reviewed the Resident’s or Physician Assistant’s note (as assigned above), and agree with the findings and plan except as documented in my note.  33 yr F with hx of colitis, h pylori with complaints of abd pain. Pt  was d/c today after an admission and work up for same symptoms. Had abd US done, no CT imaging during recent admission. VS reviewed, pt well appearing, NAD. Head ncat, pharyngeal exam w/o erythema, edema or exudates. B/l TM wnl. normal s1s2 without any murmurs, Lungs CTAB with normal work of breathing. abd +BS, s/nd, + generalized abd ttp, no CVAT b/l, extremities wnl, neuro exam grossly normal. No acute skin rashes. Plan is labs, abd imaging and reassess.

## 2019-04-18 NOTE — ED PROVIDER NOTE - NS ED ROS FT
Eyes:  No visual changes, eye pain or discharge.  ENMT:  No hearing changes, pain, no sore throat or runny nose, no difficulty swallowing  Cardiac:  No chest pain, SOB or edema. No chest pain with exertion.  Respiratory:  No cough or respiratory distress. No hemoptysis. No history of asthma or RAD.  GI:  No diarrhea. +nausea, vomiting, abdominal pain.  :  No dysuria, frequency or burning.  MS:  No myalgia, muscle weakness, joint pain or back pain.  Neuro:  No headache or weakness.  No LOC.  Skin:  No skin rash.   Endocrine: No history of thyroid disease or diabetes.

## 2019-04-18 NOTE — ED PROVIDER NOTE - PROGRESS NOTE DETAILS
Authored by Dr. Fonseca signout received from Dr. Carcamo for continued care Authored by Dr. Fonseca. Abd soft non tender. Discussed with patient labs and imaging. Discussed need to follow up with PMD, GI, OBGYN. Discussed signs and symptoms to return to the ED for. Pt understands and agrees with discharge plan

## 2019-04-18 NOTE — ED ADULT NURSE NOTE - PMH
Colitis    Gastritis, Helicobacter pylori    H/O Clostridium difficile infection    History of marijuana use

## 2019-04-18 NOTE — ED PROVIDER NOTE - PHYSICAL EXAMINATION
CONSTITUTIONAL: Well-developed; well-nourished; in no acute distress.   SKIN: warm, dry  HEAD: Normocephalic; atraumatic.  EYES: PERRL, EOMI, no conjunctival erythema  ENT: No nasal discharge; airway clear.  NECK: Supple; non tender.  CARD: S1, S2 normal; no murmurs, gallops, or rubs. Regular rate and rhythm.   RESP: No wheezes, rales or rhonchi.  ABD: tenderness to palpation of the LUQ w/ mild guarding.  EXT: Normal ROM.  No clubbing, cyanosis or edema.   LYMPH: No acute cervical adenopathy.  NEURO: Alert, oriented, grossly unremarkable  PSYCH: Cooperative, appropriate.

## 2019-04-18 NOTE — ED PROVIDER NOTE - CARE PROVIDER_API CALL
Nas Roberson)  Gastroenterology; Internal Medicine  [address] [place] [address]  Phone: [phone]  Fax: [phone]  Follow Up Time: 7-10 Days

## 2019-04-18 NOTE — ED PROVIDER NOTE - NSFOLLOWUPINSTRUCTIONS_ED_ALL_ED_FT
Abdominal Pain    Many things can cause abdominal pain. Many times, abdominal pain is not caused by a disease and will improve without treatment. Your health care provider will do a physical exam to determine if there is a dangerous cause of your pain; blood tests and imaging may help determine the cause of your pain. However, in many cases, no cause may be found and you may need further testing as an outpatient. Monitor your abdominal pain for any changes.     SEEK IMMEDIATE MEDICAL CARE IF YOU HAVE ANY OF THE FOLLOWING SYMPTOMS: worsening abdominal pain, uncontrollable vomiting, profuse diarrhea, inability to have bowel movements or pass gas, black or bloody stools, fever accompanying chest pain or back pain, or fainting. These symptoms may represent a serious problem that is an emergency. Do not wait to see if the symptoms will go away. Get medical help right away. Call 911 and do not drive yourself to the hospital      Ovarian Cyst  An ovarian cyst is a fluid-filled sac on an ovary. The ovaries are organs that make eggs in women. Most ovarian cysts go away on their own and are not cancerous (are benign). Some cysts need treatment.    Follow these instructions at home:  Take over-the-counter and prescription medicines only as told by your doctor.  Do not drive or use heavy machinery while taking prescription pain medicine.  Get pelvic exams and Pap tests as often as told by your doctor.  Return to your normal activities as told by your doctor. Ask your doctor what activities are safe for you.  Do not use any products that contain nicotine or tobacco, such as cigarettes and e-cigarettes. If you need help quitting, ask your doctor.  Keep all follow-up visits as told by your doctor. This is important.  Contact a doctor if:  ImageYour periods are:    Late.  Irregular.  Painful.    Your periods stop.  You have pelvic pain that does not go away.  You have pressure on your bladder.  You have trouble making your bladder empty when you pee (urinate).  You have pain during sex.  You have any of the following in your belly (abdomen):    A feeling of fullness.  Pressure.  Discomfort.  Pain that does not go away.  Swelling.    You feel sick most of the time.  You have trouble pooping (have constipation).  You are not as hungry as usual (you lose your appetite).  You get very bad acne.  You start to have more hair on your body and face.  You are gaining weight or losing weight without changing your exercise and eating habits.  You think you may be pregnant.  Get help right away if:  You have belly pain that is very bad or gets worse.  You cannot eat or drink without throwing up (vomiting).  You suddenly get a fever.  Your period is a lot heavier than usual.  This information is not intended to replace advice given to you by your health care provider. Make sure you discuss any questions you have with your health care provider.

## 2019-04-18 NOTE — ED PROVIDER NOTE - CLINICAL SUMMARY MEDICAL DECISION MAKING FREE TEXT BOX
pt returning after being d/nancy today with complaints of abd pain. Labs and CT abd pelv w/o acute findings. Will d/c with GI f/up.

## 2019-04-25 DIAGNOSIS — K58.9 IRRITABLE BOWEL SYNDROME WITHOUT DIARRHEA: ICD-10-CM

## 2019-04-25 DIAGNOSIS — R10.9 UNSPECIFIED ABDOMINAL PAIN: ICD-10-CM

## 2019-04-25 DIAGNOSIS — Z87.440 PERSONAL HISTORY OF URINARY (TRACT) INFECTIONS: ICD-10-CM

## 2019-04-25 DIAGNOSIS — R11.2 NAUSEA WITH VOMITING, UNSPECIFIED: ICD-10-CM

## 2019-04-25 DIAGNOSIS — Z88.2 ALLERGY STATUS TO SULFONAMIDES: ICD-10-CM

## 2019-04-25 DIAGNOSIS — K27.9 PEPTIC ULCER, SITE UNSPECIFIED, UNSPECIFIED AS ACUTE OR CHRONIC, WITHOUT HEMORRHAGE OR PERFORATION: ICD-10-CM

## 2019-04-25 DIAGNOSIS — Z88.0 ALLERGY STATUS TO PENICILLIN: ICD-10-CM

## 2019-04-25 DIAGNOSIS — Z87.891 PERSONAL HISTORY OF NICOTINE DEPENDENCE: ICD-10-CM

## 2019-04-25 DIAGNOSIS — F12.188 CANNABIS ABUSE WITH OTHER CANNABIS-INDUCED DISORDER: ICD-10-CM

## 2019-04-25 DIAGNOSIS — Z86.19 PERSONAL HISTORY OF OTHER INFECTIOUS AND PARASITIC DISEASES: ICD-10-CM

## 2019-04-25 DIAGNOSIS — K29.70 GASTRITIS, UNSPECIFIED, WITHOUT BLEEDING: ICD-10-CM

## 2019-04-25 DIAGNOSIS — Z91.040 LATEX ALLERGY STATUS: ICD-10-CM

## 2019-04-25 DIAGNOSIS — K22.8 OTHER SPECIFIED DISEASES OF ESOPHAGUS: ICD-10-CM

## 2019-12-15 ENCOUNTER — EMERGENCY (EMERGENCY)
Facility: HOSPITAL | Age: 34
LOS: 0 days | Discharge: HOME | End: 2019-12-16
Attending: EMERGENCY MEDICINE | Admitting: EMERGENCY MEDICINE
Payer: COMMERCIAL

## 2019-12-15 VITALS
RESPIRATION RATE: 18 BRPM | SYSTOLIC BLOOD PRESSURE: 130 MMHG | OXYGEN SATURATION: 99 % | DIASTOLIC BLOOD PRESSURE: 78 MMHG | TEMPERATURE: 98 F | HEART RATE: 91 BPM

## 2019-12-15 DIAGNOSIS — Z88.0 ALLERGY STATUS TO PENICILLIN: ICD-10-CM

## 2019-12-15 DIAGNOSIS — R10.9 UNSPECIFIED ABDOMINAL PAIN: ICD-10-CM

## 2019-12-15 DIAGNOSIS — R10.13 EPIGASTRIC PAIN: ICD-10-CM

## 2019-12-15 DIAGNOSIS — Z91.040 LATEX ALLERGY STATUS: ICD-10-CM

## 2019-12-15 DIAGNOSIS — Z88.2 ALLERGY STATUS TO SULFONAMIDES: ICD-10-CM

## 2019-12-15 LAB
ALBUMIN SERPL ELPH-MCNC: 4.9 G/DL — SIGNIFICANT CHANGE UP (ref 3.5–5.2)
ALP SERPL-CCNC: 58 U/L — SIGNIFICANT CHANGE UP (ref 30–115)
ALT FLD-CCNC: 14 U/L — SIGNIFICANT CHANGE UP (ref 0–41)
ANION GAP SERPL CALC-SCNC: 17 MMOL/L — HIGH (ref 7–14)
APPEARANCE UR: CLEAR — SIGNIFICANT CHANGE UP
AST SERPL-CCNC: 22 U/L — SIGNIFICANT CHANGE UP (ref 0–41)
BASOPHILS # BLD AUTO: 0.02 K/UL — SIGNIFICANT CHANGE UP (ref 0–0.2)
BASOPHILS NFR BLD AUTO: 0.3 % — SIGNIFICANT CHANGE UP (ref 0–1)
BILIRUB SERPL-MCNC: 0.4 MG/DL — SIGNIFICANT CHANGE UP (ref 0.2–1.2)
BILIRUB UR-MCNC: NEGATIVE — SIGNIFICANT CHANGE UP
BUN SERPL-MCNC: 6 MG/DL — LOW (ref 10–20)
CALCIUM SERPL-MCNC: 9.7 MG/DL — SIGNIFICANT CHANGE UP (ref 8.5–10.1)
CHLORIDE SERPL-SCNC: 101 MMOL/L — SIGNIFICANT CHANGE UP (ref 98–110)
CO2 SERPL-SCNC: 22 MMOL/L — SIGNIFICANT CHANGE UP (ref 17–32)
COLOR SPEC: COLORLESS — SIGNIFICANT CHANGE UP
CREAT SERPL-MCNC: 0.7 MG/DL — SIGNIFICANT CHANGE UP (ref 0.7–1.5)
DIFF PNL FLD: NEGATIVE — SIGNIFICANT CHANGE UP
EOSINOPHIL # BLD AUTO: 0.01 K/UL — SIGNIFICANT CHANGE UP (ref 0–0.7)
EOSINOPHIL NFR BLD AUTO: 0.1 % — SIGNIFICANT CHANGE UP (ref 0–8)
GLUCOSE SERPL-MCNC: 106 MG/DL — HIGH (ref 70–99)
GLUCOSE UR QL: NEGATIVE — SIGNIFICANT CHANGE UP
HCG SERPL QL: NEGATIVE — SIGNIFICANT CHANGE UP
HCT VFR BLD CALC: 43.5 % — SIGNIFICANT CHANGE UP (ref 37–47)
HGB BLD-MCNC: 14.3 G/DL — SIGNIFICANT CHANGE UP (ref 12–16)
IMM GRANULOCYTES NFR BLD AUTO: 0.3 % — SIGNIFICANT CHANGE UP (ref 0.1–0.3)
KETONES UR-MCNC: ABNORMAL
LACTATE SERPL-SCNC: 1.7 MMOL/L — SIGNIFICANT CHANGE UP (ref 0.7–2)
LEUKOCYTE ESTERASE UR-ACNC: NEGATIVE — SIGNIFICANT CHANGE UP
LIDOCAIN IGE QN: 10 U/L — SIGNIFICANT CHANGE UP (ref 7–60)
LYMPHOCYTES # BLD AUTO: 1.47 K/UL — SIGNIFICANT CHANGE UP (ref 1.2–3.4)
LYMPHOCYTES # BLD AUTO: 19.1 % — LOW (ref 20.5–51.1)
MCHC RBC-ENTMCNC: 25.2 PG — LOW (ref 27–31)
MCHC RBC-ENTMCNC: 32.9 G/DL — SIGNIFICANT CHANGE UP (ref 32–37)
MCV RBC AUTO: 76.6 FL — LOW (ref 81–99)
MONOCYTES # BLD AUTO: 0.29 K/UL — SIGNIFICANT CHANGE UP (ref 0.1–0.6)
MONOCYTES NFR BLD AUTO: 3.8 % — SIGNIFICANT CHANGE UP (ref 1.7–9.3)
NEUTROPHILS # BLD AUTO: 5.9 K/UL — SIGNIFICANT CHANGE UP (ref 1.4–6.5)
NEUTROPHILS NFR BLD AUTO: 76.4 % — HIGH (ref 42.2–75.2)
NITRITE UR-MCNC: NEGATIVE — SIGNIFICANT CHANGE UP
NRBC # BLD: 0 /100 WBCS — SIGNIFICANT CHANGE UP (ref 0–0)
PH UR: 7.5 — SIGNIFICANT CHANGE UP (ref 5–8)
PLATELET # BLD AUTO: 330 K/UL — SIGNIFICANT CHANGE UP (ref 130–400)
POTASSIUM SERPL-MCNC: 3.7 MMOL/L — SIGNIFICANT CHANGE UP (ref 3.5–5)
POTASSIUM SERPL-SCNC: 3.7 MMOL/L — SIGNIFICANT CHANGE UP (ref 3.5–5)
PROT SERPL-MCNC: 7.9 G/DL — SIGNIFICANT CHANGE UP (ref 6–8)
PROT UR-MCNC: NEGATIVE — SIGNIFICANT CHANGE UP
RBC # BLD: 5.68 M/UL — HIGH (ref 4.2–5.4)
RBC # FLD: 15.2 % — HIGH (ref 11.5–14.5)
SODIUM SERPL-SCNC: 140 MMOL/L — SIGNIFICANT CHANGE UP (ref 135–146)
SP GR SPEC: 1.01 — LOW (ref 1.01–1.02)
UROBILINOGEN FLD QL: SIGNIFICANT CHANGE UP
WBC # BLD: 7.71 K/UL — SIGNIFICANT CHANGE UP (ref 4.8–10.8)
WBC # FLD AUTO: 7.71 K/UL — SIGNIFICANT CHANGE UP (ref 4.8–10.8)

## 2019-12-15 PROCEDURE — 99285 EMERGENCY DEPT VISIT HI MDM: CPT

## 2019-12-15 RX ORDER — SODIUM CHLORIDE 9 MG/ML
2000 INJECTION INTRAMUSCULAR; INTRAVENOUS; SUBCUTANEOUS ONCE
Refills: 0 | Status: COMPLETED | OUTPATIENT
Start: 2019-12-15 | End: 2019-12-15

## 2019-12-15 RX ORDER — FAMOTIDINE 10 MG/ML
20 INJECTION INTRAVENOUS ONCE
Refills: 0 | Status: COMPLETED | OUTPATIENT
Start: 2019-12-15 | End: 2019-12-15

## 2019-12-15 RX ORDER — ONDANSETRON 8 MG/1
4 TABLET, FILM COATED ORAL ONCE
Refills: 0 | Status: COMPLETED | OUTPATIENT
Start: 2019-12-15 | End: 2019-12-15

## 2019-12-15 RX ORDER — MORPHINE SULFATE 50 MG/1
4 CAPSULE, EXTENDED RELEASE ORAL ONCE
Refills: 0 | Status: DISCONTINUED | OUTPATIENT
Start: 2019-12-15 | End: 2019-12-15

## 2019-12-15 RX ORDER — DIPHENHYDRAMINE HYDROCHLORIDE AND LIDOCAINE HYDROCHLORIDE AND ALUMINUM HYDROXIDE AND MAGNESIUM HYDRO
10 KIT ONCE
Refills: 0 | Status: COMPLETED | OUTPATIENT
Start: 2019-12-15 | End: 2019-12-15

## 2019-12-15 RX ADMIN — Medication 20 MILLIGRAM(S): at 20:16

## 2019-12-15 RX ADMIN — SODIUM CHLORIDE 2000 MILLILITER(S): 9 INJECTION INTRAMUSCULAR; INTRAVENOUS; SUBCUTANEOUS at 19:39

## 2019-12-15 RX ADMIN — ONDANSETRON 4 MILLIGRAM(S): 8 TABLET, FILM COATED ORAL at 19:40

## 2019-12-15 RX ADMIN — FAMOTIDINE 20 MILLIGRAM(S): 10 INJECTION INTRAVENOUS at 19:40

## 2019-12-15 RX ADMIN — MORPHINE SULFATE 4 MILLIGRAM(S): 50 CAPSULE, EXTENDED RELEASE ORAL at 21:54

## 2019-12-15 RX ADMIN — DIPHENHYDRAMINE HYDROCHLORIDE AND LIDOCAINE HYDROCHLORIDE AND ALUMINUM HYDROXIDE AND MAGNESIUM HYDRO 10 MILLILITER(S): KIT at 20:16

## 2019-12-15 NOTE — ED PROVIDER NOTE - OBJECTIVE STATEMENT
34 year old female with a pmh of gastritis presents here c/o epigastric abdominal pain associated with nbnb vomiting and diarrhea. Pain is similar to her previous episodes of gastritis. Patient has been taking pepcid and protonix without much improvement. Patient also began taking tinazaloe 2 days ago for vaginal infection. Denies fever chills cough cp sob urinary frequency urgency burning. LMP 11/25/19

## 2019-12-15 NOTE — ED PROVIDER NOTE - CLINICAL SUMMARY MEDICAL DECISION MAKING FREE TEXT BOX
Patient remained stable in ER, improved well during the course of ER stay. States is feeling lot better, want to go home and f/u as out-patient. Patient is awake, alert, o x 3, ambulatory comfortable, tolerated PO. Discussed with patient in detail about her clinical condition, results of the diagnostic studies and the need for close out-patient follow up. Detail aftercare instructions and return precautions are given.

## 2019-12-15 NOTE — ED PROVIDER NOTE - PHYSICAL EXAMINATION
CONSTITUTIONAL: WA / WN / NAD  HEAD: NCAT  EYES: PERRL; EOMI;   ENT: Normal pharynx; mucous membranes pink/moist, no erythema.  NECK: Supple; no meningeal signs  CARD: RRR; nl S1/S2; no M/R/G.   RESP: Respiratory rate and effort are normal; breath sounds clear and equal bilaterally.  ABD: Soft, ND +epigastric ttp  MSK/EXT: No gross deformities; full range of motion.  SKIN: Warm and dry;   NEURO: AAOx3  PSYCH: Memory Intact, Normal Affect

## 2019-12-15 NOTE — ED PROVIDER NOTE - ATTENDING CONTRIBUTION TO CARE
patient is c/o abdominal pain, upper abdominal area, mostly epigastric area, associated with n/v; denies f/c/cp/sob; denies vaginal bleeding/discharge, no urnay symptoms. patient has an appointment with her GYN doctor for tomorrow, denies recent travel.   vitals noted  lungs: CTA, no crackles  abd: +BS, +mid epigastric tenderness, ND, soft  A/P: Abdominal pain/vomiting  labs, symptomatic treatment  IVF, reevaluation

## 2019-12-15 NOTE — ED PROVIDER NOTE - CARE PROVIDER_API CALL
Raoul Santiago)  Gastroenterology; Internal Medicine  [address] [place] [address]  Phone: [phone]  Fax: [phone]  Follow Up Time:

## 2019-12-15 NOTE — ED PROVIDER NOTE - NS ED ROS FT
Constitutional: See HPI.  Eyes: No visual changes  ENMT: No neck pain   Cardiac: No cp  Respiratory: No cough   GI: see hpi  : see hpi  MS: No myalgia, muscle weakness, joint pain or back pain.  Psych: No suicidal or homicidal ideations.  Neuro: No headache  Skin: No skin rash.

## 2019-12-15 NOTE — ED PROVIDER NOTE - PATIENT PORTAL LINK FT
You can access the FollowMyHealth Patient Portal offered by [facility] by registering at the following website: [email or web address]. By joining [facility]’s FollowMyHealth portal, you will also be able to view your health information using other applications (apps) compatible with our system.

## 2019-12-16 PROCEDURE — 74177 CT ABD & PELVIS W/CONTRAST: CPT | Mod: 26

## 2019-12-16 RX ORDER — KETOROLAC TROMETHAMINE 30 MG/ML
15 SYRINGE (ML) INJECTION ONCE
Refills: 0 | Status: DISCONTINUED | OUTPATIENT
Start: 2019-12-16 | End: 2019-12-16

## 2019-12-16 RX ADMIN — Medication 15 MILLIGRAM(S): at 01:14

## 2019-12-17 LAB
CULTURE RESULTS: SIGNIFICANT CHANGE UP
SPECIMEN SOURCE: SIGNIFICANT CHANGE UP

## 2020-07-20 NOTE — PATIENT PROFILE ADULT - IS PATIENT PREGNANT?
RX outgoing call regarding [facility] @ [facility]. Picking up on    with patients consent. Copay of $0.00 @ 004. Address and  confirmed. Patient has 0 doses on hand at this time. Patient has not started any new medications, has had no missed doses and no side effects present. Patient is currently taking the medication as directed by doctors instruction. Patient states they do not have any questions or concerns at this time. Patient's next dose is . No sharps container needed at this time.   
unknown

## 2020-08-18 NOTE — ED PROVIDER NOTE - CARE PROVIDERS DIRECT ADDRESSES
Show Lateral Platysmal Band Units: Yes Left Pupillary Line Units: 0 Expiration Date (Month Year): 04.2023 Show Lcl Units: No Lot #: Lot [id number] Forehead Units: 14 Price (Use Numbers Only, No Special Characters Or $): 083 Dilution (U/0.1 Cc): 4 Consent: Written consent obtained. Risks include but not limited to lid/brow ptosis, bruising, swelling, diplopia, temporary effect, incomplete chemical denervation. Detail Level: Simple Post-Care Instructions: Patient instructed to not lie down for 4 hours and limit physical activity for 24 hours. Patient instructed not to travel by airplane for 48 hours. Glabellar Complex Units: 9 ,DirectAddress_Unknown

## 2020-10-30 ENCOUNTER — TRANSCRIPTION ENCOUNTER (OUTPATIENT)
Age: 35
End: 2020-10-30

## 2020-11-18 ENCOUNTER — TRANSCRIPTION ENCOUNTER (OUTPATIENT)
Age: 35
End: 2020-11-18

## 2021-01-15 ENCOUNTER — TRANSCRIPTION ENCOUNTER (OUTPATIENT)
Age: 36
End: 2021-01-15

## 2021-01-29 ENCOUNTER — EMERGENCY (EMERGENCY)
Facility: HOSPITAL | Age: 36
LOS: 0 days | Discharge: HOME | End: 2021-01-29
Attending: EMERGENCY MEDICINE | Admitting: EMERGENCY MEDICINE
Payer: SELF-PAY

## 2021-01-29 VITALS
WEIGHT: 136.91 LBS | RESPIRATION RATE: 18 BRPM | TEMPERATURE: 98 F | SYSTOLIC BLOOD PRESSURE: 120 MMHG | HEIGHT: 61 IN | HEART RATE: 81 BPM | OXYGEN SATURATION: 100 % | DIASTOLIC BLOOD PRESSURE: 86 MMHG

## 2021-01-29 DIAGNOSIS — Z88.0 ALLERGY STATUS TO PENICILLIN: ICD-10-CM

## 2021-01-29 DIAGNOSIS — R10.9 UNSPECIFIED ABDOMINAL PAIN: ICD-10-CM

## 2021-01-29 DIAGNOSIS — F17.200 NICOTINE DEPENDENCE, UNSPECIFIED, UNCOMPLICATED: ICD-10-CM

## 2021-01-29 DIAGNOSIS — Z88.2 ALLERGY STATUS TO SULFONAMIDES: ICD-10-CM

## 2021-01-29 DIAGNOSIS — Z91.040 LATEX ALLERGY STATUS: ICD-10-CM

## 2021-01-29 DIAGNOSIS — Z88.8 ALLERGY STATUS TO OTHER DRUGS, MEDICAMENTS AND BIOLOGICAL SUBSTANCES: ICD-10-CM

## 2021-01-29 DIAGNOSIS — Z90.49 ACQUIRED ABSENCE OF OTHER SPECIFIED PARTS OF DIGESTIVE TRACT: ICD-10-CM

## 2021-01-29 DIAGNOSIS — R10.13 EPIGASTRIC PAIN: ICD-10-CM

## 2021-01-29 DIAGNOSIS — R11.0 NAUSEA: ICD-10-CM

## 2021-01-29 DIAGNOSIS — R35.0 FREQUENCY OF MICTURITION: ICD-10-CM

## 2021-01-29 LAB
ALBUMIN SERPL ELPH-MCNC: 4.5 G/DL — SIGNIFICANT CHANGE UP (ref 3.5–5.2)
ALP SERPL-CCNC: 55 U/L — SIGNIFICANT CHANGE UP (ref 30–115)
ALT FLD-CCNC: 11 U/L — SIGNIFICANT CHANGE UP (ref 0–41)
ANION GAP SERPL CALC-SCNC: 7 MMOL/L — SIGNIFICANT CHANGE UP (ref 7–14)
APPEARANCE UR: CLEAR — SIGNIFICANT CHANGE UP
AST SERPL-CCNC: 19 U/L — SIGNIFICANT CHANGE UP (ref 0–41)
BASOPHILS # BLD AUTO: 0.04 K/UL — SIGNIFICANT CHANGE UP (ref 0–0.2)
BASOPHILS NFR BLD AUTO: 0.6 % — SIGNIFICANT CHANGE UP (ref 0–1)
BILIRUB SERPL-MCNC: 0.4 MG/DL — SIGNIFICANT CHANGE UP (ref 0.2–1.2)
BILIRUB UR-MCNC: NEGATIVE — SIGNIFICANT CHANGE UP
BUN SERPL-MCNC: 9 MG/DL — LOW (ref 10–20)
CALCIUM SERPL-MCNC: 8.4 MG/DL — LOW (ref 8.5–10.1)
CHLORIDE SERPL-SCNC: 103 MMOL/L — SIGNIFICANT CHANGE UP (ref 98–110)
CO2 SERPL-SCNC: 26 MMOL/L — SIGNIFICANT CHANGE UP (ref 17–32)
COLOR SPEC: SIGNIFICANT CHANGE UP
CREAT SERPL-MCNC: 0.7 MG/DL — SIGNIFICANT CHANGE UP (ref 0.7–1.5)
DIFF PNL FLD: NEGATIVE — SIGNIFICANT CHANGE UP
EOSINOPHIL # BLD AUTO: 0.11 K/UL — SIGNIFICANT CHANGE UP (ref 0–0.7)
EOSINOPHIL NFR BLD AUTO: 1.5 % — SIGNIFICANT CHANGE UP (ref 0–8)
GLUCOSE SERPL-MCNC: 81 MG/DL — SIGNIFICANT CHANGE UP (ref 70–99)
GLUCOSE UR QL: NEGATIVE — SIGNIFICANT CHANGE UP
HCG SERPL QL: NEGATIVE — SIGNIFICANT CHANGE UP
HCG SERPL-ACNC: 0.6 MIU/ML — SIGNIFICANT CHANGE UP
HCT VFR BLD CALC: 38.8 % — SIGNIFICANT CHANGE UP (ref 37–47)
HGB BLD-MCNC: 12.6 G/DL — SIGNIFICANT CHANGE UP (ref 12–16)
IMM GRANULOCYTES NFR BLD AUTO: 0.1 % — SIGNIFICANT CHANGE UP (ref 0.1–0.3)
KETONES UR-MCNC: ABNORMAL
LACTATE SERPL-SCNC: 0.8 MMOL/L — SIGNIFICANT CHANGE UP (ref 0.7–2)
LEUKOCYTE ESTERASE UR-ACNC: NEGATIVE — SIGNIFICANT CHANGE UP
LIDOCAIN IGE QN: 13 U/L — SIGNIFICANT CHANGE UP (ref 7–60)
LYMPHOCYTES # BLD AUTO: 3.59 K/UL — HIGH (ref 1.2–3.4)
LYMPHOCYTES # BLD AUTO: 49.8 % — SIGNIFICANT CHANGE UP (ref 20.5–51.1)
MCHC RBC-ENTMCNC: 26 PG — LOW (ref 27–31)
MCHC RBC-ENTMCNC: 32.5 G/DL — SIGNIFICANT CHANGE UP (ref 32–37)
MCV RBC AUTO: 80.2 FL — LOW (ref 81–99)
MONOCYTES # BLD AUTO: 0.38 K/UL — SIGNIFICANT CHANGE UP (ref 0.1–0.6)
MONOCYTES NFR BLD AUTO: 5.3 % — SIGNIFICANT CHANGE UP (ref 1.7–9.3)
NEUTROPHILS # BLD AUTO: 3.08 K/UL — SIGNIFICANT CHANGE UP (ref 1.4–6.5)
NEUTROPHILS NFR BLD AUTO: 42.7 % — SIGNIFICANT CHANGE UP (ref 42.2–75.2)
NITRITE UR-MCNC: NEGATIVE — SIGNIFICANT CHANGE UP
NRBC # BLD: 0 /100 WBCS — SIGNIFICANT CHANGE UP (ref 0–0)
PH UR: 6.5 — SIGNIFICANT CHANGE UP (ref 5–8)
PLATELET # BLD AUTO: 329 K/UL — SIGNIFICANT CHANGE UP (ref 130–400)
POTASSIUM SERPL-MCNC: 4.3 MMOL/L — SIGNIFICANT CHANGE UP (ref 3.5–5)
POTASSIUM SERPL-SCNC: 4.3 MMOL/L — SIGNIFICANT CHANGE UP (ref 3.5–5)
PROT SERPL-MCNC: 6.8 G/DL — SIGNIFICANT CHANGE UP (ref 6–8)
PROT UR-MCNC: NEGATIVE — SIGNIFICANT CHANGE UP
RBC # BLD: 4.84 M/UL — SIGNIFICANT CHANGE UP (ref 4.2–5.4)
RBC # FLD: 13.6 % — SIGNIFICANT CHANGE UP (ref 11.5–14.5)
SODIUM SERPL-SCNC: 136 MMOL/L — SIGNIFICANT CHANGE UP (ref 135–146)
SP GR SPEC: 1.01 — SIGNIFICANT CHANGE UP (ref 1.01–1.03)
UROBILINOGEN FLD QL: SIGNIFICANT CHANGE UP
WBC # BLD: 7.21 K/UL — SIGNIFICANT CHANGE UP (ref 4.8–10.8)
WBC # FLD AUTO: 7.21 K/UL — SIGNIFICANT CHANGE UP (ref 4.8–10.8)

## 2021-01-29 PROCEDURE — 99053 MED SERV 10PM-8AM 24 HR FAC: CPT

## 2021-01-29 PROCEDURE — 99284 EMERGENCY DEPT VISIT MOD MDM: CPT

## 2021-01-29 RX ORDER — MORPHINE SULFATE 50 MG/1
4 CAPSULE, EXTENDED RELEASE ORAL ONCE
Refills: 0 | Status: DISCONTINUED | OUTPATIENT
Start: 2021-01-29 | End: 2021-01-29

## 2021-01-29 RX ORDER — PANTOPRAZOLE SODIUM 20 MG/1
40 TABLET, DELAYED RELEASE ORAL ONCE
Refills: 0 | Status: COMPLETED | OUTPATIENT
Start: 2021-01-29 | End: 2021-01-29

## 2021-01-29 RX ORDER — MORPHINE SULFATE 50 MG/1
6 CAPSULE, EXTENDED RELEASE ORAL ONCE
Refills: 0 | Status: DISCONTINUED | OUTPATIENT
Start: 2021-01-29 | End: 2021-01-29

## 2021-01-29 RX ORDER — OMEPRAZOLE 10 MG/1
1 CAPSULE, DELAYED RELEASE ORAL
Qty: 30 | Refills: 0
Start: 2021-01-29 | End: 2021-02-27

## 2021-01-29 RX ORDER — ONDANSETRON 8 MG/1
4 TABLET, FILM COATED ORAL ONCE
Refills: 0 | Status: COMPLETED | OUTPATIENT
Start: 2021-01-29 | End: 2021-01-29

## 2021-01-29 RX ORDER — KETOROLAC TROMETHAMINE 30 MG/ML
15 SYRINGE (ML) INJECTION ONCE
Refills: 0 | Status: DISCONTINUED | OUTPATIENT
Start: 2021-01-29 | End: 2021-01-29

## 2021-01-29 RX ORDER — SODIUM CHLORIDE 9 MG/ML
1000 INJECTION INTRAMUSCULAR; INTRAVENOUS; SUBCUTANEOUS ONCE
Refills: 0 | Status: COMPLETED | OUTPATIENT
Start: 2021-01-29 | End: 2021-01-29

## 2021-01-29 RX ADMIN — MORPHINE SULFATE 6 MILLIGRAM(S): 50 CAPSULE, EXTENDED RELEASE ORAL at 06:23

## 2021-01-29 RX ADMIN — MORPHINE SULFATE 4 MILLIGRAM(S): 50 CAPSULE, EXTENDED RELEASE ORAL at 04:49

## 2021-01-29 RX ADMIN — SODIUM CHLORIDE 1000 MILLILITER(S): 9 INJECTION INTRAMUSCULAR; INTRAVENOUS; SUBCUTANEOUS at 03:33

## 2021-01-29 RX ADMIN — PANTOPRAZOLE SODIUM 40 MILLIGRAM(S): 20 TABLET, DELAYED RELEASE ORAL at 04:48

## 2021-01-29 RX ADMIN — Medication 15 MILLIGRAM(S): at 03:33

## 2021-01-29 RX ADMIN — ONDANSETRON 4 MILLIGRAM(S): 8 TABLET, FILM COATED ORAL at 03:33

## 2021-01-29 NOTE — ED PROVIDER NOTE - PHYSICAL EXAMINATION
CONSTITUTIONAL: Well-developed; well-nourished; in no acute distress.   SKIN: warm, dry  HEAD: Normocephalic; atraumatic  CARD: S1, S2 normal; no murmurs, gallops, or rubs. Regular rate and rhythm  RESP: No wheezes, rales or rhonchi  ABD: soft nondistended, no rebound or guarding, nontender abdomen, no cva tenderness, no organomegaly  EXT: Normal ROM.  No clubbing, cyanosis or edema.   NEURO: Alert, oriented, grossly unremarkable  PSYCH: Cooperative, appropriate.

## 2021-01-29 NOTE — ED ADULT NURSE NOTE - OBJECTIVE STATEMENT
35yr old female presents w/ epigastric abd pain and nausea, vomiting for 3 days, urinary frequency and urgency for the last 5 days.

## 2021-01-29 NOTE — ED PROVIDER NOTE - NSFOLLOWUPCLINICS_GEN_ALL_ED_FT
[facility]  Medicine  [address], [place]   Phone: [phone]  Fax:   Follow Up Time: Urgent

## 2021-01-29 NOTE — ED PROVIDER NOTE - PROGRESS NOTE DETAILS
Pt now denies any active abd pain. feels much better. Wants to leave and f/u w GI on outpatient basis. Full DC instructions discussed and patient knows when to seek immediate medical attention. Patient has proper follow-up. All results discussed with the patient they may require further work-up. Limitations of ED work-up discussed. All  questions and concerns from patient or family addressed. Understanding of insturctions verbalized

## 2021-01-29 NOTE — ED PROVIDER NOTE - NS ED ROS FT
Eyes:  No visual changes, eye pain or discharge.  ENMT:  No hearing changes, pain, discharge or infections. No neck pain or stiffness.  Cardiac:  No chest pain, SOB or edema. No chest pain with exertion.  Respiratory:  No cough or respiratory distress. No hemoptysis. No history of asthma or RAD.  GI:  No vomiting, diarrhea +nausea +abdominal pain.  :  No dysuria or burning. +frequency  MS:  No myalgia, muscle weakness, joint pain or back pain.  Neuro:  No headache or weakness.  No LOC.  Skin:  No skin rash.   Endocrine: No history of thyroid disease or diabetes.

## 2021-01-29 NOTE — ED PROVIDER NOTE - WET READ LAUNCH FT
Team discharged with ER PA. Pt does not have an IV. Pt's father given discharge instructions including follow-up information. All questions answered. Pt's father verbalized understanding. Wheelchair offered. Pt ambulated with a slow gait to waiting area alongside his father and sibling.   There are no Wet Read(s) to document.

## 2021-01-29 NOTE — ED PROVIDER NOTE - OBJECTIVE STATEMENT
35yoF w/ pmhx of h.pylori gastritis, marijuana use, c diff infx, h/o appendectomy, who present with epigastric abdominal pain and nausea x3 days. Endorses 5d of urinary frequency and urgency that wakes her up from her sleep. She's had episodes of nbnb emesis each day for 3d as well. Denies f, c, cp, sob, diarrhea. She's had this issue multiple times over the past 2-3 years and has had 2 colonoscopies and 2 endoscopies done. Her last one was within a year and she was told that she had inflammation in her colon. She has Hx of UTI and STI (trichamonas). Denies hematuria, dysuria, vaginal discharge, burning with urination, or being sexually active in past 4month. She's using marijuana again because of a bad breakup with her boyfriend recently.

## 2021-01-29 NOTE — ED PROVIDER NOTE - PATIENT PORTAL LINK FT
You can access the FollowMyHealth Patient Portal offered by [facility] [place] by registering at the following website: [email or web address]. By joining [employer]’s FollowMyHealth portal, you will also be able to view your health information using other applications (apps) compatible with our system.

## 2021-01-29 NOTE — ED PROVIDER NOTE - ATTENDING CONTRIBUTION TO CARE
I personally evaluated the patient. I reviewed the Resident’s or Physician Assistant’s note (as assigned above), and agree with the findings and plan except as documented in my note.    35yoF w/ pmhx of h.pylori gastritis, marijuana use, c diff infx, h/o appendectomy, who present with epigastric abdominal pain and nausea x3 days. No CP. No back pain. No fever.     CONSTITUTIONAL: Well-developed; well-nourished; in no acute distress. Sitting up and providing appropriate history and physical examination  SKIN: skin exam is warm and dry, no acute rash.  HEAD: Normocephalic; atraumatic.  EYES: PERRL, 3 mm bilateral, no nystagmus, EOM intact; conjunctiva and sclera clear.  ENT: No nasal discharge; airway clear.  NECK: Supple; non tender.+ full passive ROM in all directions. No JVD  CARD: S1, S2 normal; no murmurs, gallops, or rubs. Regular rate and rhythm. + Symmetric Strong Pulses  RESP: No wheezes, rales or rhonchi. Good air movement bilaterally  ABD: soft; non-distended; non-tender. No Rebound, No Gaurding, No signs of peritnitis, No CVA tenderness  EXT: Normal ROM. No clubbing, cyanosis or edema. Dp and Pt Pulses intact. Cap refill less than 3 seconds  NEURO: CN 2-12 intact, normal finger to nose, normal romberg, stable gait, no sensory or motor deficits, Alert, oriented, grossly unremarkable. No Focal deficits. GCS 15. NIH 0  PSYCH: Cooperative, appropriate.    Plan- labs, protonix, zofran, reassess

## 2021-01-30 LAB
CULTURE RESULTS: SIGNIFICANT CHANGE UP
SPECIMEN SOURCE: SIGNIFICANT CHANGE UP

## 2021-02-01 ENCOUNTER — INPATIENT (INPATIENT)
Facility: HOSPITAL | Age: 36
LOS: 2 days | Discharge: HOME | End: 2021-02-04
Attending: INTERNAL MEDICINE | Admitting: INTERNAL MEDICINE
Payer: MEDICAID

## 2021-02-01 VITALS
HEIGHT: 61 IN | SYSTOLIC BLOOD PRESSURE: 145 MMHG | DIASTOLIC BLOOD PRESSURE: 90 MMHG | WEIGHT: 136.91 LBS | OXYGEN SATURATION: 100 % | TEMPERATURE: 98 F | HEART RATE: 98 BPM | RESPIRATION RATE: 18 BRPM

## 2021-02-01 DIAGNOSIS — Z90.49 ACQUIRED ABSENCE OF OTHER SPECIFIED PARTS OF DIGESTIVE TRACT: Chronic | ICD-10-CM

## 2021-02-01 LAB
ALBUMIN SERPL ELPH-MCNC: 4.8 G/DL — SIGNIFICANT CHANGE UP (ref 3.5–5.2)
ALP SERPL-CCNC: 66 U/L — SIGNIFICANT CHANGE UP (ref 30–115)
ALT FLD-CCNC: 13 U/L — SIGNIFICANT CHANGE UP (ref 0–41)
ANION GAP SERPL CALC-SCNC: 14 MMOL/L — SIGNIFICANT CHANGE UP (ref 7–14)
APPEARANCE UR: CLEAR — SIGNIFICANT CHANGE UP
AST SERPL-CCNC: 21 U/L — SIGNIFICANT CHANGE UP (ref 0–41)
BACTERIA # UR AUTO: NEGATIVE — SIGNIFICANT CHANGE UP
BASOPHILS # BLD AUTO: 0.04 K/UL — SIGNIFICANT CHANGE UP (ref 0–0.2)
BASOPHILS NFR BLD AUTO: 0.7 % — SIGNIFICANT CHANGE UP (ref 0–1)
BILIRUB SERPL-MCNC: 0.4 MG/DL — SIGNIFICANT CHANGE UP (ref 0.2–1.2)
BILIRUB UR-MCNC: NEGATIVE — SIGNIFICANT CHANGE UP
BUN SERPL-MCNC: 6 MG/DL — LOW (ref 10–20)
C TRACH RRNA SPEC QL NAA+PROBE: SIGNIFICANT CHANGE UP
CALCIUM SERPL-MCNC: 9.8 MG/DL — SIGNIFICANT CHANGE UP (ref 8.5–10.1)
CHLORIDE SERPL-SCNC: 101 MMOL/L — SIGNIFICANT CHANGE UP (ref 98–110)
CO2 SERPL-SCNC: 22 MMOL/L — SIGNIFICANT CHANGE UP (ref 17–32)
COLOR SPEC: YELLOW — SIGNIFICANT CHANGE UP
CREAT SERPL-MCNC: 0.8 MG/DL — SIGNIFICANT CHANGE UP (ref 0.7–1.5)
DIFF PNL FLD: ABNORMAL
EOSINOPHIL # BLD AUTO: 0.05 K/UL — SIGNIFICANT CHANGE UP (ref 0–0.7)
EOSINOPHIL NFR BLD AUTO: 0.8 % — SIGNIFICANT CHANGE UP (ref 0–8)
EPI CELLS # UR: 6 /HPF — HIGH (ref 0–5)
GLUCOSE BLDC GLUCOMTR-MCNC: 78 MG/DL — SIGNIFICANT CHANGE UP (ref 70–99)
GLUCOSE SERPL-MCNC: 79 MG/DL — SIGNIFICANT CHANGE UP (ref 70–99)
GLUCOSE UR QL: NEGATIVE — SIGNIFICANT CHANGE UP
HCG SERPL QL: NEGATIVE — SIGNIFICANT CHANGE UP
HCT VFR BLD CALC: 42.1 % — SIGNIFICANT CHANGE UP (ref 37–47)
HGB BLD-MCNC: 14.1 G/DL — SIGNIFICANT CHANGE UP (ref 12–16)
HYALINE CASTS # UR AUTO: 1 /LPF — SIGNIFICANT CHANGE UP (ref 0–7)
IMM GRANULOCYTES NFR BLD AUTO: 0.2 % — SIGNIFICANT CHANGE UP (ref 0.1–0.3)
KETONES UR-MCNC: ABNORMAL
LEUKOCYTE ESTERASE UR-ACNC: NEGATIVE — SIGNIFICANT CHANGE UP
LIDOCAIN IGE QN: 9 U/L — SIGNIFICANT CHANGE UP (ref 7–60)
LYMPHOCYTES # BLD AUTO: 1.69 K/UL — SIGNIFICANT CHANGE UP (ref 1.2–3.4)
LYMPHOCYTES # BLD AUTO: 28.3 % — SIGNIFICANT CHANGE UP (ref 20.5–51.1)
MCHC RBC-ENTMCNC: 26.5 PG — LOW (ref 27–31)
MCHC RBC-ENTMCNC: 33.5 G/DL — SIGNIFICANT CHANGE UP (ref 32–37)
MCV RBC AUTO: 79.1 FL — LOW (ref 81–99)
MONOCYTES # BLD AUTO: 0.36 K/UL — SIGNIFICANT CHANGE UP (ref 0.1–0.6)
MONOCYTES NFR BLD AUTO: 6 % — SIGNIFICANT CHANGE UP (ref 1.7–9.3)
N GONORRHOEA RRNA SPEC QL NAA+PROBE: SIGNIFICANT CHANGE UP
NEUTROPHILS # BLD AUTO: 3.83 K/UL — SIGNIFICANT CHANGE UP (ref 1.4–6.5)
NEUTROPHILS NFR BLD AUTO: 64 % — SIGNIFICANT CHANGE UP (ref 42.2–75.2)
NITRITE UR-MCNC: NEGATIVE — SIGNIFICANT CHANGE UP
NRBC # BLD: 0 /100 WBCS — SIGNIFICANT CHANGE UP (ref 0–0)
PH UR: 6 — SIGNIFICANT CHANGE UP (ref 5–8)
PLATELET # BLD AUTO: 344 K/UL — SIGNIFICANT CHANGE UP (ref 130–400)
POTASSIUM SERPL-MCNC: 3.9 MMOL/L — SIGNIFICANT CHANGE UP (ref 3.5–5)
POTASSIUM SERPL-SCNC: 3.9 MMOL/L — SIGNIFICANT CHANGE UP (ref 3.5–5)
PROT SERPL-MCNC: 7.6 G/DL — SIGNIFICANT CHANGE UP (ref 6–8)
PROT UR-MCNC: SIGNIFICANT CHANGE UP
RBC # BLD: 5.32 M/UL — SIGNIFICANT CHANGE UP (ref 4.2–5.4)
RBC # FLD: 13.4 % — SIGNIFICANT CHANGE UP (ref 11.5–14.5)
RBC CASTS # UR COMP ASSIST: 1 /HPF — SIGNIFICANT CHANGE UP (ref 0–4)
SARS-COV-2 RNA SPEC QL NAA+PROBE: SIGNIFICANT CHANGE UP
SODIUM SERPL-SCNC: 137 MMOL/L — SIGNIFICANT CHANGE UP (ref 135–146)
SP GR SPEC: 1.02 — SIGNIFICANT CHANGE UP (ref 1.01–1.03)
SPECIMEN SOURCE: SIGNIFICANT CHANGE UP
UROBILINOGEN FLD QL: SIGNIFICANT CHANGE UP
WBC # BLD: 5.98 K/UL — SIGNIFICANT CHANGE UP (ref 4.8–10.8)
WBC # FLD AUTO: 5.98 K/UL — SIGNIFICANT CHANGE UP (ref 4.8–10.8)
WBC UR QL: 3 /HPF — SIGNIFICANT CHANGE UP (ref 0–5)

## 2021-02-01 PROCEDURE — 99285 EMERGENCY DEPT VISIT HI MDM: CPT

## 2021-02-01 PROCEDURE — 74177 CT ABD & PELVIS W/CONTRAST: CPT | Mod: 26

## 2021-02-01 PROCEDURE — 93010 ELECTROCARDIOGRAM REPORT: CPT

## 2021-02-01 PROCEDURE — 71045 X-RAY EXAM CHEST 1 VIEW: CPT | Mod: 26

## 2021-02-01 RX ORDER — PHENAZOPYRIDINE HCL 100 MG
100 TABLET ORAL ONCE
Refills: 0 | Status: COMPLETED | OUTPATIENT
Start: 2021-02-01 | End: 2021-02-01

## 2021-02-01 RX ORDER — TRAMADOL HYDROCHLORIDE 50 MG/1
25 TABLET ORAL ONCE
Refills: 0 | Status: DISCONTINUED | OUTPATIENT
Start: 2021-02-01 | End: 2021-02-01

## 2021-02-01 RX ORDER — SODIUM CHLORIDE 9 MG/ML
1000 INJECTION, SOLUTION INTRAVENOUS
Refills: 0 | Status: DISCONTINUED | OUTPATIENT
Start: 2021-02-01 | End: 2021-02-02

## 2021-02-01 RX ORDER — INFLUENZA VIRUS VACCINE 15; 15; 15; 15 UG/.5ML; UG/.5ML; UG/.5ML; UG/.5ML
0.5 SUSPENSION INTRAMUSCULAR ONCE
Refills: 0 | Status: DISCONTINUED | OUTPATIENT
Start: 2021-02-01 | End: 2021-02-04

## 2021-02-01 RX ORDER — HALOPERIDOL DECANOATE 100 MG/ML
2 INJECTION INTRAMUSCULAR ONCE
Refills: 0 | Status: COMPLETED | OUTPATIENT
Start: 2021-02-01 | End: 2021-02-01

## 2021-02-01 RX ORDER — ENOXAPARIN SODIUM 100 MG/ML
40 INJECTION SUBCUTANEOUS DAILY
Refills: 0 | Status: DISCONTINUED | OUTPATIENT
Start: 2021-02-01 | End: 2021-02-04

## 2021-02-01 RX ORDER — SUCRALFATE 1 G
1 TABLET ORAL ONCE
Refills: 0 | Status: COMPLETED | OUTPATIENT
Start: 2021-02-01 | End: 2021-02-01

## 2021-02-01 RX ORDER — ONDANSETRON 8 MG/1
4 TABLET, FILM COATED ORAL EVERY 6 HOURS
Refills: 0 | Status: DISCONTINUED | OUTPATIENT
Start: 2021-02-01 | End: 2021-02-03

## 2021-02-01 RX ORDER — KETOROLAC TROMETHAMINE 30 MG/ML
15 SYRINGE (ML) INJECTION EVERY 8 HOURS
Refills: 0 | Status: DISCONTINUED | OUTPATIENT
Start: 2021-02-01 | End: 2021-02-02

## 2021-02-01 RX ORDER — SODIUM CHLORIDE 9 MG/ML
3000 INJECTION, SOLUTION INTRAVENOUS ONCE
Refills: 0 | Status: COMPLETED | OUTPATIENT
Start: 2021-02-01 | End: 2021-02-01

## 2021-02-01 RX ORDER — METRONIDAZOLE 500 MG
500 TABLET ORAL EVERY 12 HOURS
Refills: 0 | Status: DISCONTINUED | OUTPATIENT
Start: 2021-02-01 | End: 2021-02-03

## 2021-02-01 RX ORDER — ONDANSETRON 8 MG/1
4 TABLET, FILM COATED ORAL ONCE
Refills: 0 | Status: COMPLETED | OUTPATIENT
Start: 2021-02-01 | End: 2021-02-01

## 2021-02-01 RX ORDER — PANTOPRAZOLE SODIUM 20 MG/1
40 TABLET, DELAYED RELEASE ORAL
Refills: 0 | Status: DISCONTINUED | OUTPATIENT
Start: 2021-02-01 | End: 2021-02-01

## 2021-02-01 RX ORDER — AMITRIPTYLINE HCL 25 MG
1 TABLET ORAL
Qty: 0 | Refills: 0 | DISCHARGE

## 2021-02-01 RX ORDER — MORPHINE SULFATE 50 MG/1
6 CAPSULE, EXTENDED RELEASE ORAL ONCE
Refills: 0 | Status: DISCONTINUED | OUTPATIENT
Start: 2021-02-01 | End: 2021-02-01

## 2021-02-01 RX ORDER — PANTOPRAZOLE SODIUM 20 MG/1
40 TABLET, DELAYED RELEASE ORAL
Refills: 0 | Status: DISCONTINUED | OUTPATIENT
Start: 2021-02-01 | End: 2021-02-03

## 2021-02-01 RX ORDER — ACETAMINOPHEN 500 MG
650 TABLET ORAL EVERY 6 HOURS
Refills: 0 | Status: DISCONTINUED | OUTPATIENT
Start: 2021-02-01 | End: 2021-02-04

## 2021-02-01 RX ORDER — FAMOTIDINE 10 MG/ML
20 INJECTION INTRAVENOUS ONCE
Refills: 0 | Status: COMPLETED | OUTPATIENT
Start: 2021-02-01 | End: 2021-02-01

## 2021-02-01 RX ADMIN — Medication 1 GRAM(S): at 07:13

## 2021-02-01 RX ADMIN — Medication 100 MILLIGRAM(S): at 23:01

## 2021-02-01 RX ADMIN — Medication 1 MILLIGRAM(S): at 06:29

## 2021-02-01 RX ADMIN — ONDANSETRON 4 MILLIGRAM(S): 8 TABLET, FILM COATED ORAL at 13:54

## 2021-02-01 RX ADMIN — FAMOTIDINE 20 MILLIGRAM(S): 10 INJECTION INTRAVENOUS at 04:52

## 2021-02-01 RX ADMIN — TRAMADOL HYDROCHLORIDE 25 MILLIGRAM(S): 50 TABLET ORAL at 23:01

## 2021-02-01 RX ADMIN — Medication 20 MILLIGRAM(S): at 07:13

## 2021-02-01 RX ADMIN — HALOPERIDOL DECANOATE 2 MILLIGRAM(S): 100 INJECTION INTRAMUSCULAR at 05:59

## 2021-02-01 RX ADMIN — ONDANSETRON 4 MILLIGRAM(S): 8 TABLET, FILM COATED ORAL at 23:19

## 2021-02-01 RX ADMIN — Medication 650 MILLIGRAM(S): at 14:25

## 2021-02-01 RX ADMIN — SODIUM CHLORIDE 3000 MILLILITER(S): 9 INJECTION, SOLUTION INTRAVENOUS at 04:40

## 2021-02-01 RX ADMIN — Medication 15 MILLIGRAM(S): at 18:39

## 2021-02-01 RX ADMIN — Medication 100 MILLIGRAM(S): at 18:40

## 2021-02-01 RX ADMIN — SODIUM CHLORIDE 60 MILLILITER(S): 9 INJECTION, SOLUTION INTRAVENOUS at 22:12

## 2021-02-01 RX ADMIN — PANTOPRAZOLE SODIUM 40 MILLIGRAM(S): 20 TABLET, DELAYED RELEASE ORAL at 18:40

## 2021-02-01 RX ADMIN — ONDANSETRON 4 MILLIGRAM(S): 8 TABLET, FILM COATED ORAL at 04:40

## 2021-02-01 RX ADMIN — ONDANSETRON 4 MILLIGRAM(S): 8 TABLET, FILM COATED ORAL at 07:14

## 2021-02-01 NOTE — ED ADULT TRIAGE NOTE - CHIEF COMPLAINT QUOTE
pt c/o abdominal pain, nasuea, urinary frequency "strong vaginal odor" x2 days, and upper back pain x1 week. pt was dx with muscle strain upper back and prescribed zanaflex by [facility] states ineffective. Pt seen at [facility] today sent script for doxycycline for cystitis, took 1 dose at hospital.

## 2021-02-01 NOTE — ED ADULT NURSE REASSESSMENT NOTE - NS ED NURSE REASSESS COMMENT FT1
pt displaying seeking behavior. pt asking for pain meds. when rounding pt asleep in no distress. md aware.

## 2021-02-01 NOTE — H&P ADULT - NSHPPHYSICALEXAM_GEN_ALL_CORE
VITALS:   T(C): 36.6 (02-01-21 @ 03:52), Max: 36.6 (02-01-21 @ 03:52)  HR: 98 (02-01-21 @ 03:52) (98 - 98)  BP: 145/90 (02-01-21 @ 03:52) (145/90 - 145/90)  RR: 18 (02-01-21 @ 03:52) (18 - 18)  SpO2: 100% (02-01-21 @ 03:52) (100% - 100%)    GENERAL: NAD, lying in bed comfortably, appears sleepy, doesn't answer some questions  HEAD:  Atraumatic, normocephalic  EYES: EOMI, PERRLA, conjunctiva and sclera clear  ENT: Moist mucous membranes  NECK: Supple, no JVD  HEART: Regular rate and rhythm, no murmurs, rubs, or gallops, loud S1  LUNGS: Unlabored respirations.  Clear to auscultation bilaterally, no crackles, wheezing, or rhonchi  ABDOMEN: Soft, nondistended, tender epigastric area, BS+  EXTREMITIES: 2+ peripheral pulses bilaterally. No clubbing, cyanosis, or edema  NERVOUS SYSTEM:  A&Ox3, no focal deficits

## 2021-02-01 NOTE — H&P ADULT - ASSESSMENT
36 yo F with PMHx of colitis, C. diff infection, H. pylori gastritis presents to ED with complaint of abdominal pain, associated with nausea for 2 days.    #Epigastric pain, associated with nausea, vomiting  #Hx of H. pylori gastritis  - Symptoms may be due to gastritis vs PUD, given that pt had vomiting with streaks of blood  - Has not had BM for over 5 days, monitor for melena  - CT Abdomen negative for acute pathology  - Ketones present on UA, likely due to poor PO intake secondary to nausea/vomiting  - C/w PPI BID, will keep NPO for now, Zofran PRN (avoid Reglan, Compazine as pt states she has hx of allergic rxn)  - GI c/s  - Will initiate gentle hydration for now - LR at 60 cc/hr for 24 hrs    #Hx of Marijuana use  - Pt denies hx of drug use, although recent ED provider note 1/29 states she admitted to recently resuming marijuana use, similarly admitted marijuana use to ED on presentation as well  - F/u UDS, although symptoms unlikely related to marijuana use    #Recent dx of acute cystitis  - S/p Doxycycline x1 dose at [facility] 1 day prior to presentation  - Continues to endorse frequency, vaginal discharge  - UA negative for LE, nitrites  - Chlamydia/GC pending    DVT ppx: Lovenox  GI ppx: PPI  Diet: NPO for now  Activity: AAT  Full code  Dispo: admit for monitoring, GI eval 34 yo F with PMHx of colitis, C. diff infection, H. pylori gastritis presents to ED with complaint of abdominal pain, associated with nausea for 2 days.    #Epigastric pain, associated with nausea, vomiting  #Hx of H. pylori gastritis  - Symptoms may be due to gastritis vs PUD, given that pt had vomiting with streaks of blood  - Has not had BM for over 5 days, monitor for melena  - CT Abdomen negative for acute pathology  - Ketones present on UA, likely due to poor PO intake secondary to nausea/vomiting  - C/w PPI BID, will keep NPO for now, Zofran PRN (avoid Reglan, Compazine as pt states she has hx of allergic rxn)  - Advance diet as tolerated  - Will order GI PCR  - Will initiate gentle hydration for now - LR at 60 cc/hr for 24 hrs    #Hx of Marijuana use  - Pt denies hx of drug use, although recent ED provider note 1/29 states she admitted to recently resuming marijuana use, similarly admitted marijuana use to ED on presentation as well  - F/u UDS, although symptoms unlikely related to marijuana use    #Urinary frequency, vaginal discharge  - S/p Doxycycline x1 dose at [facility] 1 day prior to presentation  - Continues to endorse frequency, vaginal discharge, although UA negative for LE, nitrites  - Chlamydia/GC pending  - May be due to BV, will initiate Flagyl 500 mg BID     DVT ppx: Lovenox  GI ppx: PPI  Diet: NPO for now  Activity: AAT  Full code  Dispo: admit for monitoring, adv diet

## 2021-02-01 NOTE — ED PROVIDER NOTE - CARE PROVIDER_API CALL
Bandar Edwards (PA)  Physician Assistant Services  [address] [place] [address]  Phone: [phone]  Fax: [phone]  Follow Up Time: 1-3 Days

## 2021-02-01 NOTE — ED ADULT NURSE NOTE - CHIEF COMPLAINT QUOTE
pt c/o abdominal pain, nasuea, urinary frequency "strong vaginal odor" x2 days, and upper back pain x1 week. pt was dx with muscle strain upper back and prescribed zanaflex by [facility] states ineffective. Pt seen at [facility] today sent script for doxycycline for cystitis, took 1 dose at hospital.

## 2021-02-01 NOTE — ED ADULT NURSE NOTE - OBJECTIVE STATEMENT
Pt reports abd pain with nausea and vomiting, was treated at [facility] and d/c after pain medication and reported no relief after pain medication.

## 2021-02-01 NOTE — ED PROVIDER NOTE - ATTENDING CONTRIBUTION TO CARE
36 yo f hx h pylori, cdiff, s/p appendectomy  pt presents w/ generalized abd cramping, nausea and vom x2.  pt was seen by [facility] earlier today and had labs, CT which was negative. pt endorsing white vaginal discharge and was dc on doxycycline. pt endorsing frequency with urination as well. pt endorses mid thoracic back which is improving. pt stopped using MJ and cigs 3 weeks ago.  pt denies etoh abuse. pt has IUD in place. LMP x3.5 weeks.  pt was seen in this ED 2 days ago and had normal labs, negative UA    vss  gen- NAD, aaox3  card-rrr  lungs-ctab, no wheezing or rhonchi  abd-sntnd, no guarding or rebound  neuro- full str/sensation, cn ii-xii grossly intact, normal coordination     likely AGE if outpt CT negative  will treat conservatively, pt very concerned about getting IVF  will get interval labs 36 yo f hx h pylori, cdiff, s/p appendectomy  pt presents w/ generalized abd cramping, nausea and vom x2.  pt was seen by [facility] earlier today and had labs, CT which was negative. pt endorsing white vaginal discharge and was dc on doxycycline. pt endorsing frequency with urination as well. pt endorses mid thoracic back which is improving. pt stopped using MJ and cigs 3 weeks ago.  pt denies etoh abuse. pt has IUD in place. LMP x3.5 weeks.  pt was seen in this ED 2 days ago and had normal labs, negative UA. pt states she recently moved to . where she previously lived, she followed closely w/ GI. pt states she had lost her job/insurance and lost her GI f/u. pt states she now has a new job and insurance will kick in next month. pt states she believes a lot of her sx are from her current poor f/u and social stressors.     vss  gen- NAD, aaox3  card-rrr  lungs-ctab, no wheezing or rhonchi  abd-sntnd, no guarding or rebound  neuro- full str/sensation, cn ii-xii grossly intact, normal coordination     likely AGE if outpt CT negative  will treat conservatively, pt very concerned about getting IVF  will get interval labs

## 2021-02-01 NOTE — H&P ADULT - HISTORY OF PRESENT ILLNESS
36 yo F with PMHx of colitis, C. diff infection, H. pylori gastritis presents to ED with complaint of abdominal pain, associated with nausea for 2 days. Pt states that she developed cramping, intermittent epigastric pain, 9/10, improves with laying down and resting, relieved by Toradol, associated with persistent nausea and 3 episodes of vomiting, described as bilious with streaks of blood. Reports that symptoms have been present for about 2 days. Also endorses low grade fever of 100 in past 2 days. States that she last had a bowel movement 5 days ago, denied presence of black or bloody stools. Pt states she has had similar abdominal pain and nausea occur in the past, was previously following routinely with GI doctor, had colonoscopy done that revealed colitis.   Pt also endorses lower back pain, present for about 1 week in mid-lower back. States she had fallen down the stairs at home about 2 weeks ago and hit her lower back.  Pt states she has also been experiencing increased urinary frequency, as well as clear, milky vaginal discharge for past 2 days. Presented to [facility] 1 day prior and was given one time dose of Doxycycline.  Denies any history of alcohol, tobacco, or illicit drug use, although ED states she had admitted to marijuana use.  In the ED, VS unremarkable. Lab work unremarkable and CT Abd revealed no acute pathology. Serum pregnancy neg. UA negative for LE, nitrites, ketones present. 36 yo F with PMHx of colitis, C. diff infection, H. pylori gastritis presents to ED with complaint of abdominal pain, associated with nausea for 2 days. Pt states that she developed cramping, intermittent epigastric pain, 9/10, improves with laying down and resting, relieved by Toradol, associated with persistent nausea and 3 episodes of vomiting, described as bilious with streaks of blood. Reports that symptoms have been present for about 2 days. Also endorses low grade fever of 100 in past 2 days. States that she last had a bowel movement 5 days ago, denied presence of black or bloody stools. Pt states she has had similar abdominal pain and nausea occur in the past, was previously following routinely with GI doctor, had colonoscopy done that revealed colitis. Also had EGD done in 2019 at [facility], revealed focal non-erosive gastritis.  Pt also endorses lower back pain, present for about 1 week in mid-lower back. States she had fallen down the stairs at home about 2 weeks ago and hit her lower back.  Pt states she has also been experiencing increased urinary frequency, as well as clear, milky vaginal discharge for past 2 days. Presented to [facility] 1 day prior and was given one time dose of Doxycycline.  Denies any history of alcohol, tobacco, or illicit drug use, although ED states she had admitted to marijuana use.  In the ED, VS unremarkable. Lab work unremarkable and CT Abd revealed no acute pathology. Serum pregnancy neg. UA negative for LE, nitrites, ketones present.

## 2021-02-01 NOTE — H&P ADULT - NSHPLABSRESULTS_GEN_ALL_CORE
LABS:                          14.1   5.98  )-----------( 344      ( 01 Feb 2021 04:50 )             42.1     02-01    137  |  101  |  6<L>  ----------------------------<  79  3.9   |  22  |  0.8    Ca    9.8      01 Feb 2021 04:50    TPro  7.6  /  Alb  4.8  /  TBili  0.4  /  DBili  x   /  AST  21  /  ALT  13  /  AlkPhos  66  02-01    < from: CT Abdomen and Pelvis w/ IV Cont (02.01.21 @ 07:00) >      IMPRESSION:    1. Since December 16, 2019, no definite evidence of acute/inflammatory process within the abdomen or pelvis.  2. Trace free pelvic fluid, likely physiologic.    < end of copied text >

## 2021-02-01 NOTE — ED PROVIDER NOTE - OBJECTIVE STATEMENT
35F with past medical history of h pylori, c difficile presents with abdominal cramping, nausea, vomiting x2 and diarrhea multiples times prior to arrival. PT denies fever, chills, cough, travel, new foods. Placed on doxycycline at [facility] a few hours ago for UTI. States that workup otherwise unremarkable but she did not have full IVF given and she started vomiting again so came here. Also c/o vaginal odor, where CT abd/pel and vaginal exam at [facility] was unremarkable. LMP 3.5 weeks ago. 35F with past medical history of h pylori, c difficile presents with abdominal cramping, nausea, vomiting x2 prior to arrival. PT denies fever, chills, cough, travel, new foods, diarrhea. Placed on doxycycline at [facility] a few hours ago for UTI of which she took 1 tab. States that workup otherwise unremarkable but she did not have full IVF given and she started vomiting again so came here. Also c/o vaginal odor and white discharge, where CT abd/pel and vaginal exam at [facility] was unremarkable. +Sexually active. LMP 3.5 weeks ago.

## 2021-02-01 NOTE — ED PROVIDER NOTE - CLINICAL SUMMARY MEDICAL DECISION MAKING FREE TEXT BOX
Patient presented with abdominal pain that began PTA. Otherwise afebrile, HD stable, but (+) tender on exam. Obtained labs which were grossly unremarkable including no significant leukocytosis, anemia, signs of dehydration/JOHN, transaminitis or significant electrolyte abnormalities. UA negative for infection. CT abd/pelvis negative for acute pathologies. However, despite attempts at symptomatic control, patient with persistent pain and unable to tolerate PO. Given the above, will require admission for further management and IV hydration. Patient agreeable with plan. HD stable at time of admission.

## 2021-02-01 NOTE — ED PROVIDER NOTE - NS ED ROS FT
Constitutional: (-) diaphoresis  Eyes/ENT: (-) runny nose  Cardiovascular: (-) chest pain  Respiratory: (-) cough  Gastrointestinal: (+) vomiting, (+) diarrhea  : (-) dysuria   Musculoskeletal: (-) joint pain  Integumentary: (-) rash  Neurological: (-) LOC  Allergic/Immunologic: (-) pruritus

## 2021-02-01 NOTE — H&P ADULT - NSHPREVIEWOFSYSTEMS_GEN_ALL_CORE
REVIEW OF SYSTEMS:    CONSTITUTIONAL: No weakness, fevers or chills  EYES/ENT: No visual changes;  No vertigo or throat pain   NECK: No pain or stiffness  RESPIRATORY: No cough, wheezing, hemoptysis; No shortness of breath  CARDIOVASCULAR: No chest pain or palpitations  GASTROINTESTINAL: + epigastric pain, + nausea, vomiting, + constipation  GENITOURINARY: No dysuria, hematuria, + frequency, + vaginal discharge  NEUROLOGICAL: No numbness or weakness  SKIN: No itching, rashes

## 2021-02-01 NOTE — H&P ADULT - NSHPSOCIALHISTORY_GEN_ALL_CORE
Denies alcohol, tobacco or illicit drug use, although ED states pt had admitted to marijuana use.  Lives at home with family, works at [employer].

## 2021-02-01 NOTE — ED PROVIDER NOTE - PHYSICAL EXAMINATION
CONSTITUTIONAL: in no acute distress.   SKIN: warm, dry  HEAD: Normocephalic.  EYES: PERRL.  ENT: Airway clear.  NECK: Supple.  LYMPH: No acute cervical adenopathy.  CARD: Regular rate and rhythm.   RESP: No wheezing, rales or rhonchi.  ABD: soft, generalized TTP, no CVA tenderness.  EXT: No clubbing, cyanosis.   NEURO: Alert, oriented.  PSYCH: Cooperative, appropriate.

## 2021-02-02 ENCOUNTER — TRANSCRIPTION ENCOUNTER (OUTPATIENT)
Age: 36
End: 2021-02-02

## 2021-02-02 LAB
ALBUMIN SERPL ELPH-MCNC: 4.2 G/DL — SIGNIFICANT CHANGE UP (ref 3.5–5.2)
ALP SERPL-CCNC: 53 U/L — SIGNIFICANT CHANGE UP (ref 30–115)
ALT FLD-CCNC: 14 U/L — SIGNIFICANT CHANGE UP (ref 0–41)
AMPHET UR-MCNC: NEGATIVE — SIGNIFICANT CHANGE UP
AMPHET UR-MCNC: NEGATIVE — SIGNIFICANT CHANGE UP
ANION GAP SERPL CALC-SCNC: 17 MMOL/L — HIGH (ref 7–14)
APPEARANCE UR: CLEAR — SIGNIFICANT CHANGE UP
AST SERPL-CCNC: 23 U/L — SIGNIFICANT CHANGE UP (ref 0–41)
BACTERIA # UR AUTO: NEGATIVE — SIGNIFICANT CHANGE UP
BARBITURATES UR SCN-MCNC: NEGATIVE — SIGNIFICANT CHANGE UP
BARBITURATES UR SCN-MCNC: NEGATIVE — SIGNIFICANT CHANGE UP
BASOPHILS # BLD AUTO: 0.05 K/UL — SIGNIFICANT CHANGE UP (ref 0–0.2)
BASOPHILS NFR BLD AUTO: 0.8 % — SIGNIFICANT CHANGE UP (ref 0–1)
BENZODIAZ UR-MCNC: NEGATIVE — SIGNIFICANT CHANGE UP
BENZODIAZ UR-MCNC: NEGATIVE — SIGNIFICANT CHANGE UP
BILIRUB SERPL-MCNC: 0.4 MG/DL — SIGNIFICANT CHANGE UP (ref 0.2–1.2)
BILIRUB UR-MCNC: ABNORMAL
BUN SERPL-MCNC: 6 MG/DL — LOW (ref 10–20)
CALCIUM SERPL-MCNC: 8.9 MG/DL — SIGNIFICANT CHANGE UP (ref 8.5–10.1)
CHLORIDE SERPL-SCNC: 101 MMOL/L — SIGNIFICANT CHANGE UP (ref 98–110)
CO2 SERPL-SCNC: 19 MMOL/L — SIGNIFICANT CHANGE UP (ref 17–32)
COCAINE METAB.OTHER UR-MCNC: NEGATIVE — SIGNIFICANT CHANGE UP
COCAINE METAB.OTHER UR-MCNC: NEGATIVE — SIGNIFICANT CHANGE UP
COLOR SPEC: ABNORMAL
CREAT SERPL-MCNC: 0.8 MG/DL — SIGNIFICANT CHANGE UP (ref 0.7–1.5)
CULTURE RESULTS: NO GROWTH — SIGNIFICANT CHANGE UP
DIFF PNL FLD: NEGATIVE — SIGNIFICANT CHANGE UP
DRUG SCREEN 1, URINE RESULT: SIGNIFICANT CHANGE UP
EOSINOPHIL # BLD AUTO: 0.1 K/UL — SIGNIFICANT CHANGE UP (ref 0–0.7)
EOSINOPHIL NFR BLD AUTO: 1.6 % — SIGNIFICANT CHANGE UP (ref 0–8)
EPI CELLS # UR: 3 /HPF — SIGNIFICANT CHANGE UP (ref 0–5)
GLUCOSE SERPL-MCNC: 62 MG/DL — LOW (ref 70–99)
GLUCOSE UR QL: NEGATIVE — SIGNIFICANT CHANGE UP
HCT VFR BLD CALC: 38.5 % — SIGNIFICANT CHANGE UP (ref 37–47)
HGB BLD-MCNC: 12.7 G/DL — SIGNIFICANT CHANGE UP (ref 12–16)
HYALINE CASTS # UR AUTO: 1 /LPF — SIGNIFICANT CHANGE UP (ref 0–7)
IMM GRANULOCYTES NFR BLD AUTO: 0.2 % — SIGNIFICANT CHANGE UP (ref 0.1–0.3)
KETONES UR-MCNC: ABNORMAL
LEUKOCYTE ESTERASE UR-ACNC: NEGATIVE — SIGNIFICANT CHANGE UP
LYMPHOCYTES # BLD AUTO: 2.08 K/UL — SIGNIFICANT CHANGE UP (ref 1.2–3.4)
LYMPHOCYTES # BLD AUTO: 32.8 % — SIGNIFICANT CHANGE UP (ref 20.5–51.1)
MCHC RBC-ENTMCNC: 26.1 PG — LOW (ref 27–31)
MCHC RBC-ENTMCNC: 33 G/DL — SIGNIFICANT CHANGE UP (ref 32–37)
MCV RBC AUTO: 79.2 FL — LOW (ref 81–99)
METHADONE UR-MCNC: NEGATIVE — SIGNIFICANT CHANGE UP
METHADONE UR-MCNC: NEGATIVE — SIGNIFICANT CHANGE UP
MONOCYTES # BLD AUTO: 0.43 K/UL — SIGNIFICANT CHANGE UP (ref 0.1–0.6)
MONOCYTES NFR BLD AUTO: 6.8 % — SIGNIFICANT CHANGE UP (ref 1.7–9.3)
NEUTROPHILS # BLD AUTO: 3.68 K/UL — SIGNIFICANT CHANGE UP (ref 1.4–6.5)
NEUTROPHILS NFR BLD AUTO: 57.8 % — SIGNIFICANT CHANGE UP (ref 42.2–75.2)
NITRITE UR-MCNC: POSITIVE
NRBC # BLD: 0 /100 WBCS — SIGNIFICANT CHANGE UP (ref 0–0)
OPIATES UR-MCNC: NEGATIVE — SIGNIFICANT CHANGE UP
OPIATES UR-MCNC: POSITIVE
PCP SPEC-MCNC: SIGNIFICANT CHANGE UP
PCP UR-MCNC: NEGATIVE — SIGNIFICANT CHANGE UP
PH UR: 6.5 — SIGNIFICANT CHANGE UP (ref 5–8)
PLATELET # BLD AUTO: 324 K/UL — SIGNIFICANT CHANGE UP (ref 130–400)
POTASSIUM SERPL-MCNC: 3.9 MMOL/L — SIGNIFICANT CHANGE UP (ref 3.5–5)
POTASSIUM SERPL-SCNC: 3.9 MMOL/L — SIGNIFICANT CHANGE UP (ref 3.5–5)
PROPOXYPHENE QUALITATIVE URINE RESULT: NEGATIVE — SIGNIFICANT CHANGE UP
PROPOXYPHENE QUALITATIVE URINE RESULT: NEGATIVE — SIGNIFICANT CHANGE UP
PROT SERPL-MCNC: 6.6 G/DL — SIGNIFICANT CHANGE UP (ref 6–8)
PROT UR-MCNC: NEGATIVE — SIGNIFICANT CHANGE UP
RBC # BLD: 4.86 M/UL — SIGNIFICANT CHANGE UP (ref 4.2–5.4)
RBC # FLD: 13.3 % — SIGNIFICANT CHANGE UP (ref 11.5–14.5)
RBC CASTS # UR COMP ASSIST: 2 /HPF — SIGNIFICANT CHANGE UP (ref 0–4)
SODIUM SERPL-SCNC: 137 MMOL/L — SIGNIFICANT CHANGE UP (ref 135–146)
SP GR SPEC: 1.01 — SIGNIFICANT CHANGE UP (ref 1.01–1.03)
SPECIMEN SOURCE: SIGNIFICANT CHANGE UP
THC UR QL: NEGATIVE — SIGNIFICANT CHANGE UP
UROBILINOGEN FLD QL: ABNORMAL
WBC # BLD: 6.35 K/UL — SIGNIFICANT CHANGE UP (ref 4.8–10.8)
WBC # FLD AUTO: 6.35 K/UL — SIGNIFICANT CHANGE UP (ref 4.8–10.8)
WBC UR QL: 1 /HPF — SIGNIFICANT CHANGE UP (ref 0–5)

## 2021-02-02 PROCEDURE — 99222 1ST HOSP IP/OBS MODERATE 55: CPT

## 2021-02-02 RX ORDER — KETOROLAC TROMETHAMINE 30 MG/ML
15 SYRINGE (ML) INJECTION EVERY 4 HOURS
Refills: 0 | Status: DISCONTINUED | OUTPATIENT
Start: 2021-02-02 | End: 2021-02-03

## 2021-02-02 RX ORDER — LIDOCAINE 4 G/100G
1 CREAM TOPICAL DAILY
Refills: 0 | Status: DISCONTINUED | OUTPATIENT
Start: 2021-02-02 | End: 2021-02-04

## 2021-02-02 RX ORDER — PHENAZOPYRIDINE HCL 100 MG
100 TABLET ORAL ONCE
Refills: 0 | Status: COMPLETED | OUTPATIENT
Start: 2021-02-02 | End: 2021-02-02

## 2021-02-02 RX ORDER — MORPHINE SULFATE 50 MG/1
1 CAPSULE, EXTENDED RELEASE ORAL ONCE
Refills: 0 | Status: DISCONTINUED | OUTPATIENT
Start: 2021-02-02 | End: 2021-02-02

## 2021-02-02 RX ORDER — PHENAZOPYRIDINE HCL 100 MG
100 TABLET ORAL EVERY 8 HOURS
Refills: 0 | Status: COMPLETED | OUTPATIENT
Start: 2021-02-02 | End: 2021-02-04

## 2021-02-02 RX ORDER — TRAMADOL HYDROCHLORIDE 50 MG/1
25 TABLET ORAL ONCE
Refills: 0 | Status: DISCONTINUED | OUTPATIENT
Start: 2021-02-02 | End: 2021-02-02

## 2021-02-02 RX ORDER — SODIUM CHLORIDE 9 MG/ML
1000 INJECTION, SOLUTION INTRAVENOUS
Refills: 0 | Status: DISCONTINUED | OUTPATIENT
Start: 2021-02-02 | End: 2021-02-03

## 2021-02-02 RX ADMIN — Medication 100 MILLIGRAM(S): at 05:36

## 2021-02-02 RX ADMIN — MORPHINE SULFATE 1 MILLIGRAM(S): 50 CAPSULE, EXTENDED RELEASE ORAL at 07:39

## 2021-02-02 RX ADMIN — PANTOPRAZOLE SODIUM 40 MILLIGRAM(S): 20 TABLET, DELAYED RELEASE ORAL at 17:19

## 2021-02-02 RX ADMIN — Medication 15 MILLIGRAM(S): at 22:26

## 2021-02-02 RX ADMIN — LIDOCAINE 1 PATCH: 4 CREAM TOPICAL at 15:33

## 2021-02-02 RX ADMIN — LIDOCAINE 1 PATCH: 4 CREAM TOPICAL at 18:29

## 2021-02-02 RX ADMIN — Medication 100 MILLIGRAM(S): at 17:19

## 2021-02-02 RX ADMIN — ONDANSETRON 4 MILLIGRAM(S): 8 TABLET, FILM COATED ORAL at 13:40

## 2021-02-02 RX ADMIN — TRAMADOL HYDROCHLORIDE 25 MILLIGRAM(S): 50 TABLET ORAL at 04:27

## 2021-02-02 RX ADMIN — ONDANSETRON 4 MILLIGRAM(S): 8 TABLET, FILM COATED ORAL at 05:37

## 2021-02-02 RX ADMIN — Medication 15 MILLIGRAM(S): at 11:23

## 2021-02-02 RX ADMIN — Medication 100 MILLIGRAM(S): at 18:35

## 2021-02-02 RX ADMIN — PANTOPRAZOLE SODIUM 40 MILLIGRAM(S): 20 TABLET, DELAYED RELEASE ORAL at 05:37

## 2021-02-02 RX ADMIN — Medication 100 MILLIGRAM(S): at 05:37

## 2021-02-02 NOTE — CONSULT NOTE ADULT - SUBJECTIVE AND OBJECTIVE BOX
Gastroenterology Consultation:    Patient is a 35y old  Female who presents with a chief complaint of abdominal pain, nausea, and vomitus. PMH notable for H.pylori and C.diff. History begins from a few days ago when patient was having epigastric pain that was 9/10 in intensity. She had vomitus episodes secondary to pain. Vomitus was described as bilious and blood streaked. Last BM yesterday. No diarrhea. Stools occasionally dark when patient takes Pepto-Bismal. Reports losing "couple of pounds". Patient smokes marijuana was using heavily and quit abruptly 3 weeks ago. Reports having family members pass away due to COVID-19 which increased stress.    Patient states her symptoms feel similar to when she previously had H.pylori.   In addition to her abdominal sx, patient has had urinary frequency and milky vaginal discharge. Was discharged on doxycycline      Prior records Reviewed (Y/N): y  History obtained from person other than patient (Y/N): n    Prior EGD: Y-2019-non erosive gastritis  Prior Colonoscopy: y-2019, colitis      PAST MEDICAL & SURGICAL HISTORY:  H/O Clostridium difficile infection    History of marijuana use    Gastritis, Helicobacter pylori    Colitis    History of appendectomy    Social History:  Tobacco: y  Alcohol: denies  Drugs: marijuana     Home Medications:  Zofran 4 mg oral tablet: 1 tab(s) orally every 8 hours, As Needed (01 Feb 2021 14:08)    MEDICATIONS  (STANDING):  enoxaparin Injectable 40 milliGRAM(s) SubCutaneous daily  influenza   Vaccine 0.5 milliLiter(s) IntraMuscular once  lactated ringers. 1000 milliLiter(s) (60 mL/Hr) IV Continuous <Continuous>  metroNIDAZOLE  IVPB 500 milliGRAM(s) IV Intermittent every 12 hours  pantoprazole  Injectable 40 milliGRAM(s) IV Push two times a day    MEDICATIONS  (PRN):  acetaminophen   Tablet .. 650 milliGRAM(s) Oral every 6 hours PRN Temp greater or equal to 38C (100.4F), Moderate Pain (4 - 6)  ketorolac   Injectable 15 milliGRAM(s) IV Push every 8 hours PRN Severe Pain (7 - 10)  ondansetron Injectable 4 milliGRAM(s) IV Push every 6 hours PRN Nausea and/or Vomiting      Allergies  Bactrim (Unknown)  Compazine (Other)  latex (Unknown)  penicillin (Unknown)  Reglan (Other)  sulfa drugs (Unknown)      Review of Systems:   Constitutional fever at home  ENT/Mouth:  No Hearing Changes,  No Difficulty Swallowing  Eyes:  No Eye Pain, No Vision Changes  Cardiovascular:  No Chest Pain, No Palpitations  Respiratory:  No Cough, No Dyspnea  Gastrointestinal:  As described in HPI  Musculoskeletal:  back pain  Neuro:  No Syncope, No Dizziness  Heme/Lymph:  No Bruising, No Bleeding.    Physical Examination:  T(C): 36.4 (02-02-21 @ 13:31), Max: 37.1 (02-01-21 @ 20:03)  HR: 75 (02-02-21 @ 13:31) (61 - 108)  BP: 125/66 (02-02-21 @ 13:31) (110/74 - 136/84)  RR: 18 (02-02-21 @ 13:31) (18 - 18)  SpO2: 100% (02-01-21 @ 21:43) (98% - 100%)      02-02-21 @ 07:01  -  02-02-21 @ 13:37  --------------------------------------------------------  IN: 180 mL / OUT: 200 mL / NET: -20 mL        Constitutional: No acute distress.  Eyes:. Conjunctivae are clear, Sclera is non-icteric.  Ears Nose and Throat:  mucosa is dry  Respiratory:  No signs of respiratory distress. Lung sounds are clear bilaterally.  Cardiovascular:  S1 S2, Regular rate and rhythm.  GI: Abdomen is soft, symmetric, and non-tender without distention. There are no visible lesions. Bowel sounds are present and normoactive in all four quadrants. No masses, hepatomegaly, or splenomegaly are noted.   Neuro: No Tremor, No involuntary movements  Skin: No rashes, No Jaundice.        Data: (reviewed by attending)                        12.7   6.35  )-----------( 324      ( 02 Feb 2021 06:46 )             38.5     Hgb Trend:  12.7  02-02-21 @ 06:46  14.1  02-01-21 @ 04:50        02-02    137  |  101  |  6<L>  ----------------------------<  62<L>  3.9   |  19  |  0.8    Ca    8.9      02 Feb 2021 06:46    TPro  6.6  /  Alb  4.2  /  TBili  0.4  /  DBili  x   /  AST  23  /  ALT  14  /  AlkPhos  53  02-02    Liver panel trend:  TBili 0.4   /   AST 23   /   ALT 14   /   AlkP 53   /   Tptn 6.6   /   Alb 4.2    /   DBili --      02-02  TBili 0.4   /   AST 21   /   ALT 13   /   AlkP 66   /   Tptn 7.6   /   Alb 4.8    /   DBili --      02-01  TBili 0.4   /   AST 19   /   ALT 11   /   AlkP 55   /   Tptn 6.8   /   Alb 4.5    /   DBili --      01-29          Radiology:(reviewed by attending)       Gastroenterology Consultation:    Patient is a 35y old  Female who presents with a chief complaint of abdominal pain, nausea, and vomitus. PMH notable for H.pylori and C.diff. History begins from a few days ago when patient was having epigastric pain that was 9/10 in intensity. She had vomitus episodes secondary to pain. Vomitus was described as bilious and blood streaked. Last BM yesterday. No diarrhea. Stools occasionally dark when patient takes Pepto-Bismal. Reports losing "couple of pounds". Patient smokes marijuana was using heavily and quit abruptly 3 weeks ago. Reports having family members pass away due to COVID-19 which increased stress.     She was previously diagnosed with Marijuana hyperemesis syndrome    Patient states her symptoms feel similar to when she previously had H.pylori.   In addition to her abdominal sx, patient has had urinary frequency and milky vaginal discharge. Was discharged on doxycycline      Prior records Reviewed (Y/N): y  History obtained from person other than patient (Y/N): n    Prior EGD: 2019-non erosive gastritis  Prior Colonoscopy: y-2019, colitis      PAST MEDICAL & SURGICAL HISTORY:  H/O Clostridium difficile infection    History of marijuana use    Gastritis, Helicobacter pylori    Colitis    History of appendectomy    Social History:  Tobacco: y  Alcohol: denies  Drugs: marijuana     Home Medications:  Zofran 4 mg oral tablet: 1 tab(s) orally every 8 hours, As Needed (01 Feb 2021 14:08)    MEDICATIONS  (STANDING):  enoxaparin Injectable 40 milliGRAM(s) SubCutaneous daily  influenza   Vaccine 0.5 milliLiter(s) IntraMuscular once  lactated ringers. 1000 milliLiter(s) (60 mL/Hr) IV Continuous <Continuous>  metroNIDAZOLE  IVPB 500 milliGRAM(s) IV Intermittent every 12 hours  pantoprazole  Injectable 40 milliGRAM(s) IV Push two times a day    MEDICATIONS  (PRN):  acetaminophen   Tablet .. 650 milliGRAM(s) Oral every 6 hours PRN Temp greater or equal to 38C (100.4F), Moderate Pain (4 - 6)  ketorolac   Injectable 15 milliGRAM(s) IV Push every 8 hours PRN Severe Pain (7 - 10)  ondansetron Injectable 4 milliGRAM(s) IV Push every 6 hours PRN Nausea and/or Vomiting      Allergies  Bactrim (Unknown)  Compazine (Other)  latex (Unknown)  penicillin (Unknown)  Reglan (Other)  sulfa drugs (Unknown)      Review of Systems:   Constitutional fever at home  ENT/Mouth:  No Hearing Changes,  No Difficulty Swallowing  Eyes:  No Eye Pain, No Vision Changes  Cardiovascular:  No Chest Pain, No Palpitations  Respiratory:  No Cough, No Dyspnea  Gastrointestinal:  As described in HPI  Musculoskeletal:  back pain  Neuro:  No Syncope, No Dizziness  Heme/Lymph:  No Bruising, No Bleeding.    Physical Examination:  T(C): 36.4 (02-02-21 @ 13:31), Max: 37.1 (02-01-21 @ 20:03)  HR: 75 (02-02-21 @ 13:31) (61 - 108)  BP: 125/66 (02-02-21 @ 13:31) (110/74 - 136/84)  RR: 18 (02-02-21 @ 13:31) (18 - 18)  SpO2: 100% (02-01-21 @ 21:43) (98% - 100%)      02-02-21 @ 07:01  -  02-02-21 @ 13:37  --------------------------------------------------------  IN: 180 mL / OUT: 200 mL / NET: -20 mL        Constitutional: No acute distress.  Eyes:. Conjunctivae are clear, Sclera is non-icteric.  Ears Nose and Throat:  mucosa is dry  Respiratory:  No signs of respiratory distress. Lung sounds are clear bilaterally.  Cardiovascular:  S1 S2, Regular rate and rhythm.  GI: Abdomen is soft, symmetric, and non-tender without distention. There are no visible lesions. Bowel sounds are present and normoactive in all four quadrants. No masses, hepatomegaly, or splenomegaly are noted.   Neuro: No Tremor, No involuntary movements  Skin: No rashes, No Jaundice.        Data: (reviewed by attending)                        12.7   6.35  )-----------( 324      ( 02 Feb 2021 06:46 )             38.5     Hgb Trend:  12.7  02-02-21 @ 06:46  14.1  02-01-21 @ 04:50        02-02    137  |  101  |  6<L>  ----------------------------<  62<L>  3.9   |  19  |  0.8    Ca    8.9      02 Feb 2021 06:46    TPro  6.6  /  Alb  4.2  /  TBili  0.4  /  DBili  x   /  AST  23  /  ALT  14  /  AlkPhos  53  02-02    Liver panel trend:  TBili 0.4   /   AST 23   /   ALT 14   /   AlkP 53   /   Tptn 6.6   /   Alb 4.2    /   DBili --      02-02  TBili 0.4   /   AST 21   /   ALT 13   /   AlkP 66   /   Tptn 7.6   /   Alb 4.8    /   DBili --      02-01  TBili 0.4   /   AST 19   /   ALT 11   /   AlkP 55   /   Tptn 6.8   /   Alb 4.5    /   DBili --      01-29

## 2021-02-02 NOTE — PROGRESS NOTE ADULT - SUBJECTIVE AND OBJECTIVE BOX
SUBJECTIVE:    Patient is a 35y old Female who presents with a chief complaint of abdominal pain (2021 13:36)    Currently admitted to medicine with the primary diagnosis of: abd pain     Today is hospital day 1d.     Overnight Events:     still c/o of  epigastric abd pain associated with  vomiting . Vomitus was described as bilious and blood streaked.    PAST MEDICAL & SURGICAL HISTORY  H/O Clostridium difficile infection    History of marijuana use    Gastritis, Helicobacter pylori    Colitis    History of appendectomy        SOCIAL HISTORY:  Smoking history:  Alcohol Use;  Illicit Drug Use:    ALLERGIES:  Bactrim (Unknown)  Compazine (Other)  latex (Unknown)  penicillin (Unknown)  Reglan (Other)  sulfa drugs (Unknown)    MEDICATIONS:  STANDING MEDICATIONS  enoxaparin Injectable 40 milliGRAM(s) SubCutaneous daily  influenza   Vaccine 0.5 milliLiter(s) IntraMuscular once  lactated ringers. 1000 milliLiter(s) IV Continuous <Continuous>  metroNIDAZOLE  IVPB 500 milliGRAM(s) IV Intermittent every 12 hours  pantoprazole  Injectable 40 milliGRAM(s) IV Push two times a day  phenazopyridine 100 milliGRAM(s) Oral every 8 hours    PRN MEDICATIONS  acetaminophen   Tablet .. 650 milliGRAM(s) Oral every 6 hours PRN  ketorolac   Injectable 15 milliGRAM(s) IV Push every 4 hours PRN  lidocaine   Patch 1 Patch Transdermal daily PRN  ondansetron Injectable 4 milliGRAM(s) IV Push every 6 hours PRN    VITALS:   ICU Vital Signs Last 24 Hrs  T(C): 36.4 (2021 13:31), Max: 37.1 (2021 20:03)  T(F): 97.6 (2021 13:31), Max: 98.8 (2021 20:03)  HR: 75 (2021 13:31) (61 - 80)  BP: 125/66 (2021 13:31) (110/74 - 136/84)  BP(mean): --  ABP: --  ABP(mean): --  RR: 18 (2021 13:31) (18 - 18)  SpO2: 100% (2021 21:43) (98% - 100%)      LABS:                        12.7   6.35  )-----------( 324      ( 2021 06:46 )             38.5     02    137  |  101  |  6<L>  ----------------------------<  62<L>  3.9   |  19  |  0.8    Ca    8.9      2021 06:46    TPro  6.6  /  Alb  4.2  /  TBili  0.4  /  DBili  x   /  AST  23  /  ALT  14  /  AlkPhos  53        Urinalysis Basic - ( 2021 04:50 )    Color: Yellow / Appearance: Clear / S.024 / pH: x  Gluc: x / Ketone: Moderate  / Bili: Negative / Urobili: <2 mg/dL   Blood: x / Protein: Trace / Nitrite: Negative   Leuk Esterase: Negative / RBC: 1 /HPF / WBC 3 /HPF   Sq Epi: x / Non Sq Epi: 6 /HPF / Bacteria: Negative                  RADIOLOGY:    PHYSICAL EXAM:  GEN: General: No acute distress.    HEENT: Pupils equal and symmetrically reactive to light.    PULM: Clear to auscultation bilaterally.    CVS: Regular rate and rhythm, no murmurs, rubs, or gallops.    ABD: Soft, nondistended, no masses.    EXT: No edema.    SKIN: Warm and well perfused, no rashes.    NEURO: AAO#3     Mobility (6 Click Score):      /24    Intravenous access:   NG tube:   Mary Catheter:

## 2021-02-02 NOTE — CONSULT NOTE ADULT - ASSESSMENT
35 F who presents with a chief complaint of abdominal pain, nausea, and vomitus. PMH notable for H.pylori and C.diff. History begins from a few days ago when patient was having epigastric pain that was 9/10 in intensity. She had vomitus episodes secondary to pain. Vomitus was described as bilious and blood streaked. Last BM yesterday. No diarrhea. Reports losing "couple of pounds". Patient smokes marijuana was using heavily and quit abruptly 3 weeks ago. CT scan in ED negative. Lipase not elevated    IMPRESSION  Bilious Emesis  Possible Gastroenteritis versus Cyclical Vomiting Syndrome  Previous History of C.diff and H. Pylori    Plan  -advance diet as tolerated. anti-emetics prn  -advise marijuana cessation  -c/w IV hydration as patient appears on dry side     NOTE PENDING ATTENDING REVIEW AND RECOMMENDATIONS   35 F who presents with a chief complaint of abdominal pain, nausea, and vomitus. PMH notable for H.pylori and C.diff. History begins from a few days ago when patient was having epigastric pain that was 9/10 in intensity. She had vomitus episodes secondary to pain. Vomitus was described as bilious and blood streaked. Last BM yesterday. No diarrhea. Reports losing "couple of pounds". Patient smokes marijuana was using heavily and quit abruptly 3 weeks ago. CT scan in ED negative. Lipase not elevated.     IMPRESSION  Bilious Emesis  Possible Gastroenteritis versus Cyclical Vomiting Syndrome  Previous History of C.diff and H. Pylori    Plan  -advance diet as tolerated. Can give Zofran 5mg before meals  -advise marijuana cessation  -c/w IV hydration as patient appears on dry side   -no indication for inpatient scope at this time      35 F who presents with a chief complaint of abdominal pain, nausea, and vomitus. PMH notable for H.pylori and C.diff. History begins from a few days ago when patient was having epigastric pain that was 9/10 in intensity. She had vomitus episodes secondary to pain. Vomitus was described as bilious and blood streaked. Last BM yesterday. No diarrhea. Reports losing "couple of pounds". Patient smokes marijuana was using heavily and quit abruptly 3 weeks ago. CT scan in ED negative. Lipase not elevated.     IMPRESSION  Cyclical Vomiting Syndrome  Bilious Emesis  Previous History of C.diff and H. Pylori    Plan  -advance diet as tolerated. begin with clear liquid diet  -Can give Zofran 8mg STANDING with meals. Repeat EKG in AM to monitor QTc (was borderline on admission)  -can consider sumatriptan subcutaneous 6mg as abortive therapy for Cyclical Vomiting Syndrome; monitor for allergic reaction  -if Zofran/sumatriptan fail to improve patient, can consider aprepitant (125mg PO day 1, followed by 80mg day 2 and 3)  -if patient fails therapy, may need to initiate PPN  -advise marijuana cessation  -c/w IV hydration as patient appears on dry side   -no indication for inpatient scope at this time  -recall GI if no PO intake in 48 hours       35 F who presents with a chief complaint of abdominal pain, nausea, and vomitus. PMH notable for H.pylori and C.diff. History begins from a few days ago when patient was having epigastric pain that was 9/10 in intensity. She had vomitus episodes secondary to pain. Vomitus was described as bilious and blood streaked. Last BM yesterday. No diarrhea. Reports losing "couple of pounds". Patient smokes marijuana was using heavily and quit abruptly 3 weeks ago. CT scan in ED negative. Lipase not elevated.     IMPRESSION  Cyclical Vomiting Syndrome  Bilious Emesis  Previous History of C.diff and H. Pylori    Plan  -advance diet as tolerated. begin with clear liquid diet  -Can give Zofran 8mg STANDING with meals. Repeat EKG in AM to monitor QTc (was borderline on admission)  -Please give sumatriptan subcutaneous 6mg as abortive therapy for Cyclical Vomiting Syndrome; monitor for allergic reaction  -if Zofran/sumatriptan fail to improve patient, can consider aprepitant (125mg PO day 1, followed by 80mg day 2 and 3)  -if patient fails therapy, may need to initiate PPN  -advise marijuana cessation  -c/w IV hydration as patient appears on dry side   -no indication for inpatient scope at this time  -recall GI if no PO intake in 48 hours

## 2021-02-03 ENCOUNTER — TRANSCRIPTION ENCOUNTER (OUTPATIENT)
Age: 36
End: 2021-02-03

## 2021-02-03 LAB
ALBUMIN SERPL ELPH-MCNC: 4.1 G/DL — SIGNIFICANT CHANGE UP (ref 3.5–5.2)
ALP SERPL-CCNC: 54 U/L — SIGNIFICANT CHANGE UP (ref 30–115)
ALT FLD-CCNC: 11 U/L — SIGNIFICANT CHANGE UP (ref 0–41)
ANION GAP SERPL CALC-SCNC: 15 MMOL/L — HIGH (ref 7–14)
AST SERPL-CCNC: 21 U/L — SIGNIFICANT CHANGE UP (ref 0–41)
BILIRUB SERPL-MCNC: 0.4 MG/DL — SIGNIFICANT CHANGE UP (ref 0.2–1.2)
BUN SERPL-MCNC: 3 MG/DL — LOW (ref 10–20)
CALCIUM SERPL-MCNC: 8.8 MG/DL — SIGNIFICANT CHANGE UP (ref 8.5–10.1)
CHLORIDE SERPL-SCNC: 100 MMOL/L — SIGNIFICANT CHANGE UP (ref 98–110)
CO2 SERPL-SCNC: 22 MMOL/L — SIGNIFICANT CHANGE UP (ref 17–32)
CREAT SERPL-MCNC: 0.7 MG/DL — SIGNIFICANT CHANGE UP (ref 0.7–1.5)
CULTURE RESULTS: SIGNIFICANT CHANGE UP
CULTURE RESULTS: SIGNIFICANT CHANGE UP
GLUCOSE SERPL-MCNC: 123 MG/DL — HIGH (ref 70–99)
HCT VFR BLD CALC: 37.4 % — SIGNIFICANT CHANGE UP (ref 37–47)
HGB BLD-MCNC: 12.6 G/DL — SIGNIFICANT CHANGE UP (ref 12–16)
MAGNESIUM SERPL-MCNC: 1.7 MG/DL — LOW (ref 1.8–2.4)
MCHC RBC-ENTMCNC: 26.4 PG — LOW (ref 27–31)
MCHC RBC-ENTMCNC: 33.7 G/DL — SIGNIFICANT CHANGE UP (ref 32–37)
MCV RBC AUTO: 78.2 FL — LOW (ref 81–99)
NRBC # BLD: 0 /100 WBCS — SIGNIFICANT CHANGE UP (ref 0–0)
PLATELET # BLD AUTO: 345 K/UL — SIGNIFICANT CHANGE UP (ref 130–400)
POTASSIUM SERPL-MCNC: 3.6 MMOL/L — SIGNIFICANT CHANGE UP (ref 3.5–5)
POTASSIUM SERPL-SCNC: 3.6 MMOL/L — SIGNIFICANT CHANGE UP (ref 3.5–5)
PROT SERPL-MCNC: 6.3 G/DL — SIGNIFICANT CHANGE UP (ref 6–8)
RBC # BLD: 4.78 M/UL — SIGNIFICANT CHANGE UP (ref 4.2–5.4)
RBC # FLD: 13.1 % — SIGNIFICANT CHANGE UP (ref 11.5–14.5)
SODIUM SERPL-SCNC: 137 MMOL/L — SIGNIFICANT CHANGE UP (ref 135–146)
SPECIMEN SOURCE: SIGNIFICANT CHANGE UP
SPECIMEN SOURCE: SIGNIFICANT CHANGE UP
WBC # BLD: 5.87 K/UL — SIGNIFICANT CHANGE UP (ref 4.8–10.8)
WBC # FLD AUTO: 5.87 K/UL — SIGNIFICANT CHANGE UP (ref 4.8–10.8)

## 2021-02-03 PROCEDURE — 99232 SBSQ HOSP IP/OBS MODERATE 35: CPT

## 2021-02-03 PROCEDURE — 93010 ELECTROCARDIOGRAM REPORT: CPT

## 2021-02-03 PROCEDURE — 99233 SBSQ HOSP IP/OBS HIGH 50: CPT

## 2021-02-03 RX ORDER — MAGNESIUM SULFATE 500 MG/ML
2 VIAL (ML) INJECTION ONCE
Refills: 0 | Status: COMPLETED | OUTPATIENT
Start: 2021-02-03 | End: 2021-02-03

## 2021-02-03 RX ORDER — DIPHENHYDRAMINE HCL 50 MG
50 CAPSULE ORAL DAILY
Refills: 0 | Status: DISCONTINUED | OUTPATIENT
Start: 2021-02-03 | End: 2021-02-04

## 2021-02-03 RX ORDER — ONDANSETRON 8 MG/1
8 TABLET, FILM COATED ORAL THREE TIMES A DAY
Refills: 0 | Status: DISCONTINUED | OUTPATIENT
Start: 2021-02-03 | End: 2021-02-04

## 2021-02-03 RX ORDER — OXYBUTYNIN CHLORIDE 5 MG
5 TABLET ORAL ONCE
Refills: 0 | Status: COMPLETED | OUTPATIENT
Start: 2021-02-03 | End: 2021-02-03

## 2021-02-03 RX ORDER — METRONIDAZOLE 500 MG
1 TABLET ORAL
Qty: 14 | Refills: 0
Start: 2021-02-03 | End: 2021-02-09

## 2021-02-03 RX ORDER — PANTOPRAZOLE SODIUM 20 MG/1
40 TABLET, DELAYED RELEASE ORAL
Refills: 0 | Status: DISCONTINUED | OUTPATIENT
Start: 2021-02-03 | End: 2021-02-04

## 2021-02-03 RX ORDER — METRONIDAZOLE 500 MG
500 TABLET ORAL
Refills: 0 | Status: DISCONTINUED | OUTPATIENT
Start: 2021-02-03 | End: 2021-02-04

## 2021-02-03 RX ADMIN — PANTOPRAZOLE SODIUM 40 MILLIGRAM(S): 20 TABLET, DELAYED RELEASE ORAL at 17:54

## 2021-02-03 RX ADMIN — Medication 100 MILLIGRAM(S): at 05:27

## 2021-02-03 RX ADMIN — PANTOPRAZOLE SODIUM 40 MILLIGRAM(S): 20 TABLET, DELAYED RELEASE ORAL at 05:27

## 2021-02-03 RX ADMIN — Medication 650 MILLIGRAM(S): at 17:07

## 2021-02-03 RX ADMIN — Medication 50 GRAM(S): at 11:38

## 2021-02-03 RX ADMIN — Medication 5 MILLIGRAM(S): at 05:57

## 2021-02-03 RX ADMIN — Medication 100 MILLIGRAM(S): at 21:58

## 2021-02-03 RX ADMIN — LIDOCAINE 1 PATCH: 4 CREAM TOPICAL at 04:47

## 2021-02-03 RX ADMIN — ONDANSETRON 4 MILLIGRAM(S): 8 TABLET, FILM COATED ORAL at 00:10

## 2021-02-03 RX ADMIN — Medication 500 MILLIGRAM(S): at 17:54

## 2021-02-03 RX ADMIN — SODIUM CHLORIDE 100 MILLILITER(S): 9 INJECTION, SOLUTION INTRAVENOUS at 07:34

## 2021-02-03 RX ADMIN — ONDANSETRON 8 MILLIGRAM(S): 8 TABLET, FILM COATED ORAL at 22:40

## 2021-02-03 RX ADMIN — Medication 15 MILLIGRAM(S): at 04:50

## 2021-02-03 RX ADMIN — Medication 50 MILLIGRAM(S): at 12:45

## 2021-02-03 RX ADMIN — Medication 100 MILLIGRAM(S): at 12:46

## 2021-02-03 RX ADMIN — ONDANSETRON 8 MILLIGRAM(S): 8 TABLET, FILM COATED ORAL at 12:45

## 2021-02-03 NOTE — DISCHARGE NOTE PROVIDER - NSDCCPCAREPLAN_GEN_ALL_CORE_FT
Name band; PRINCIPAL DISCHARGE DIAGNOSIS  Diagnosis: Cannabinoid hyperemesis syndrome  Assessment and Plan of Treatment:       SECONDARY DISCHARGE DIAGNOSES  Diagnosis: Bacterial vaginosis  Assessment and Plan of Treatment:     Diagnosis: Nausea & vomiting  Assessment and Plan of Treatment:      PRINCIPAL DISCHARGE DIAGNOSIS  Diagnosis: Cannabinoid hyperemesis syndrome  Assessment and Plan of Treatment: CT Abdomen negative , UA negative for UTI , Appetite improving. Protonix 40 mg two times a day  -Zofran 8 mg three times a day with meals   -F/u Outpatient GI: Friday, February 26th at 2:30 in the [facility] at [address] [place] [address] [address]. [phone]  -Advised Marijuana cessation        SECONDARY DISCHARGE DIAGNOSES  Diagnosis: Bacterial vaginosis  Assessment and Plan of Treatment:     Diagnosis: Nausea & vomiting  Assessment and Plan of Treatment:

## 2021-02-03 NOTE — DISCHARGE NOTE PROVIDER - NSFOLLOWUPCLINICS_GEN_ALL_ED_FT
A Gastroenterologist  Gastroenterology  .  [place]   Phone:   Fax:   Follow Up Time: 2 weeks    [facility]  OB/GYN  [address] [place] [address]  Phone: [phone]  Fax:   Follow Up Time: 1 week

## 2021-02-03 NOTE — DISCHARGE NOTE PROVIDER - CARE PROVIDER_API CALL
Bandar Edwards (PA)  Physician Assistant Services  [address] [place] [address]  Phone: [phone]  Fax: [phone]  Follow Up Time:    Bandar Edwards (PA)  Physician Assistant Services  [address] [place] [address]  Phone: [phone]  Fax: [phone]  Follow Up Time: 2 weeks

## 2021-02-03 NOTE — PROGRESS NOTE ADULT - ASSESSMENT
35 F who presents with a chief complaint of Epigastric  abdominal pain, nausea, and vomitus.   Vomitus was described as bilious and blood streaked  prior h/o of  H.pylori and C.diff.    H/H stable  cont Protonix   cont IVF   r/o GIB  r/o PUD  will c/s GI     Vaginal discharge on flagyl    Marijuana Abuse   advise marijuana cessation    DVT prophyalxis on lovenox     code status  : full code   
34 yo F with PMHx of colitis, C. diff infection, H. pylori gastritis presents to ED with complaint of abdominal pain, associated with nausea for 2 days. Pt states that she developed cramping, intermittent epigastric pain, 9/10, improves with laying down and resting, relieved by Toradol, associated with persistent nausea and 3 episodes of vomiting, described as bilious with streaks of blood. Reports that symptoms have been present for about 2 days. Also endorses low grade fever of 100 in past 2 days. States that she last had a bowel movement 5 days ago, denied presence of black or bloody stools. Pt states she has had similar abdominal pain and nausea occur in the past, was previously following routinely with GI doctor, had colonoscopy done that revealed colitis. Also had EGD done in 2019 at [facility], revealed focal non-erosive gastritis.  Pt also endorses lower back pain, present for about 1 week in mid-lower back. States she had fallen down the stairs at home about 2 weeks ago and hit her lower back.    # Cyclical Vomiting  sec to cannabis abuse  - CT Abdomen and Pelvis w/ IV Cont (02.01.21 @ 07:00) >Since December 16, 2019, no definite evidence of acute/inflammatory process within the abdomen or pelvis.  Trace free pelvic fluid, likely physiologic.  - pt tolerating full liquid diet--> advance to soft GI diet  - evaluated by GI.    # Hypomagnesemia  - replete mg    # Vaginal discharge- resolved  - Chlamydia Amplification Result: NotDetec: Method: Transcription Mediated Amplification (TMA) using (02.01.21 @ 04:50)  - GC Amplification Result: NotDetec. (02.01.21 @ 04:50)  - outpt F/u with Gyn  - switch to Po flagyl 500 q12 for total of 7 days    # Gastritis  - switch to PO protonix    # Cannabis abuse   - pt counseled    # DVT prophylaxis    # Full code    # Pending: Planned for discharge today  # Discussed with  patient test results and plan of care with  plans for discharge today   # Disposition: home      
35 F who presents with a chief complaint of abdominal pain, nausea, and vomitus. PMH notable for H.pylori and C.diff. History begins from a few days ago when patient was having epigastric pain that was 9/10 in intensity. She had vomitus episodes secondary to pain. Vomitus was described as bilious and blood streaked. Last BM yesterday. No diarrhea. Reports losing "couple of pounds". Patient smokes marijuana was using heavily and quit abruptly 3 weeks ago. CT scan in ED negative. Lipase not elevated.     IMPRESSION  Cyclical Vomiting Syndrome  Bilious Emesis  Previous History of C.diff and H. Pylori    Improving    Plan  -advance diet as tolerated. currently on full liquid diet, tolerating well  -optimize other medical problems (patient endorsing poor sleep due to UTI sx, only on metro for abx therapy for presumed vaginosis)   -  Zofran 8mg STANDING with meals. Repeat EKG in AM to monitor QTc (was borderline on admission)  -if symptoms worsen or fail to improve, can give sumatriptan subcutaneous 6mg as abortive therapy for Cyclical Vomiting Syndrome; monitor for allergic reaction   -if Zofran/sumatriptan fail to improve patient, can consider aprepitant (125mg PO day 1, followed by 80mg day 2 and 3)  -if patient fails therapy, may need to initiate PPN  -advise marijuana cessation  -no indication for inpatient scope at this time    Recall as needed

## 2021-02-03 NOTE — CHART NOTE - NSCHARTNOTEFT_GEN_A_CORE
D/w Dr. Sterling    Patient is a 36 yo F who was admitted for abdominal pain and emesis. No emesis overnight or this morning. Reports improved pain this morning. Appetite is better- eating broth this morning. Reports not sleeping last night 2/2 to urinary urgency. Reports improving white vaginal discharge    Vital Signs Last 24 Hrs  T(F): 98.8 (03 Feb 2021 05:26), Max: 98.8 (02 Feb 2021 21:20)  HR: 68 (03 Feb 2021 05:26) (68 - 75)  BP: 121/63 (03 Feb 2021 05:26) (121/63 - 125/66)  RR: 20 (03 Feb 2021 05:26) (18 - 20)  SpO2: 100% (03 Feb 2021 05:26) (100% - 100%)    Constitutional: NAD, A&O x3. Non-diaphoretic, non-toxic appearing. Drinking broth.   Eyes: PERRLA. EOMI. Sclera white.   Respiratory: +air entry, no rales, no rhonchi, no wheezes  Cardiovascular: +S1 and S2, regular rate and rhythm. No murmurs, rubs, gallops.  Gastrointestinal: +BS, soft, non-tender, not distended. No suprapubic tenderness. No ecchymosis. No CVA tenderness  Extremities:  no edema, no calf tenderness  Vascular: +dorsal pedis and radial pulses, no extremity cyanosis  Neurological: sensation intact, ROM equal B/L, CN II-XII intact    Urinalysis (02.02.21 @ 22:08)    pH Urine: 6.5    Glucose Qualitative, Urine: Negative    Blood, Urine: Negative    Color: Lisbeth    Urine Appearance: Clear    Bilirubin: Small    Ketone - Urine: Moderate    Specific Gravity: 1.009    Protein, Urine: Negative    Urobilinogen: 3 mg/dL    Nitrite: Positive    Leukocyte Esterase Concentration: Negative    Urine Microscopic-Add On (NC) (02.02.21 @ 22:08)    Bacteria: Negative    Epithelial Cells: 3 /HPF    Red Blood Cell - Urine: 2 /HPF    White Blood Cell - Urine: 1 /HPF    Hyaline Casts: 1 /LPF    Plan:   -Advance diet as tolerated   -IV Magnesium   -Protonix  -BV: Flagyl x 7 days. Outpatient gyn follow up   -UA negative for UTI. Afebrile. No white count. No CVAT  -Chlamydia and Gonorrhea negative   -GI consulted: Cyclical Vomiting Syndrome. Recommended Zofran 8 mg standing with meals. EKG ordered.  ---advise marijuana cessation  ---no indication for inpatient scope at this time
Patient to be discharged home tomorrow     Patient was scheduled for the following outpatient appointments:     -F/u Outpatient GI: Friday, February 26th at 2:30 in the [facility] at [address] [place] [address] [address] [phone]  -F/u outpatient GYN: Thursday, February 11th at 8:00 am in the [facility] at [address] [place] [address] [address] [phone]      Patient is agreeable to the plan
Patient is a 35 year old Female with a past medical history of C. diff colitis, H. pylori, drug abuse presented to the ER on 2/1 with a chief complaint of abdominal pain x 2 days with nausea. CT Abdomen in the ER revealed no acute pathology. Patient was placed NPO. GI PCR sent. Patient also endorsed a Vaginal discharge with frequency. GC Chlamydia negative. Empirically treating her with Flagyl for Bacterial vaginosis.     INTERVAL EVENTS  Patient was seen and examined at bedside. Patient states she was only able to sleep for 90 minutes, as she is very nauseous and had 3 episodes of orange emesis. States she feels better after emesis. However pain returns 2-3 hours post emesis. Urinary urgency is getting better post antibiotics and AZO.     Vital Signs Last 24 Hrs  T(C): 36 (02 Feb 2021 05:58), Max: 37.1 (01 Feb 2021 20:03)  T(F): 96.8 (02 Feb 2021 05:58), Max: 98.8 (01 Feb 2021 20:03)  HR: 61 (02 Feb 2021 05:58) (61 - 108)  BP: 129/60 (02 Feb 2021 05:58) (110/74 - 136/84)  RR: 18 (02 Feb 2021 05:58) (18 - 18)  SpO2: 100% (01 Feb 2021 21:43) (98% - 100%)    PLAN:   - GI Consult   - No Opioids   - Continue with Antiemetics (No Reglan)   - Will advance diet after GI and when she is able to

## 2021-02-03 NOTE — DISCHARGE NOTE PROVIDER - NSDCFUADDAPPT_GEN_ALL_CORE_FT
Gastroenterology: Friday, February 26th at 2:30 pm in the [facility] at [address] [place] [address] [address]. [phone]  Gynecology: Thursday, February 11th at 8:00 am in the [facility] at [address] [place] [address] [address]. [phone]

## 2021-02-03 NOTE — PROGRESS NOTE ADULT - SUBJECTIVE AND OBJECTIVE BOX
Gastroenterology progress note:     Patient is a 35y old  Female who presents with a chief complaint of abdominal pain (02 Feb 2021 15:34)    Admitted on: 02-01-21    We are following the patient for: Abdominal Pain, Nausea, Vomiting. Likely due to cyclical vomiting syndrome      Interval History: Patient tolerating some PO intake. Had broth, tea. Denies any vomitus episodes. Urinating alot overnight, had poor sleep    Patient's medical problems are improving/stable/not improving/unstable/   Prior records reviewed (Y/N): Y  History obtained from someone other than patient (Y/N):N      PAST MEDICAL & SURGICAL HISTORY:  H/O Clostridium difficile infection    History of marijuana use    Gastritis, Helicobacter pylori    Colitis    History of appendectomy        MEDICATIONS  (STANDING):  enoxaparin Injectable 40 milliGRAM(s) SubCutaneous daily  influenza   Vaccine 0.5 milliLiter(s) IntraMuscular once  metroNIDAZOLE  IVPB 500 milliGRAM(s) IV Intermittent every 12 hours  ondansetron Injectable 8 milliGRAM(s) IV Push three times a day  pantoprazole  Injectable 40 milliGRAM(s) IV Push two times a day  phenazopyridine 100 milliGRAM(s) Oral every 8 hours    MEDICATIONS  (PRN):  acetaminophen   Tablet .. 650 milliGRAM(s) Oral every 6 hours PRN Temp greater or equal to 38C (100.4F), Moderate Pain (4 - 6)  lidocaine   Patch 1 Patch Transdermal daily PRN pain      Allergies  Bactrim (Unknown)  Compazine (Other)  latex (Unknown)  penicillin (Unknown)  Reglan (Other)  sulfa drugs (Unknown)      Review of Systems:   Cardiovascular:  No Chest Pain, No Palpitations. abdominal pain resolved, now feels pressure under right breast  Respiratory:  No Cough, No Dyspnea  Gastrointestinal:  As described in HPI    Physical Examination:  T(C): 37.1 (02-03-21 @ 05:26), Max: 37.1 (02-02-21 @ 21:20)  HR: 68 (02-03-21 @ 05:26) (68 - 75)  BP: 121/63 (02-03-21 @ 05:26) (121/63 - 125/66)  RR: 20 (02-03-21 @ 05:26) (18 - 20)  SpO2: 100% (02-03-21 @ 05:26) (100% - 100%)      02-02-21 @ 07:01  -  02-03-21 @ 07:00  --------------------------------------------------------  IN: 956 mL / OUT: 1203 mL / NET: -247 mL    02-03-21 @ 07:01  -  02-03-21 @ 09:22  --------------------------------------------------------  IN: 390 mL / OUT: 0 mL / NET: 390 mL      Constitutional: No acute distress.  Respiratory:  No signs of respiratory distress. Lung sounds are clear bilaterally.  Cardiovascular:  S1 S2, Regular rate and rhythm.  Abdominal: Abdomen is soft, symmetric, and non-tender without distention. There are no visible lesions. Bowel sounds are present and normoactive in all four quadrants. No masses, hepatomegaly, or splenomegaly are noted.   Skin: No rashes, No Jaundice.        Data: (reviewed by attending)                        12.6   5.87  )-----------( 345      ( 03 Feb 2021 06:00 )             37.4     Hgb trend:  12.6  02-03-21 @ 06:00  12.7  02-02-21 @ 06:46  14.1  02-01-21 @ 04:50        02-03    137  |  100  |  3<L>  ----------------------------<  123<H>  3.6   |  22  |  0.7    Ca    8.8      03 Feb 2021 06:00  Mg     1.7     02-03    TPro  6.3  /  Alb  4.1  /  TBili  0.4  /  DBili  x   /  AST  21  /  ALT  11  /  AlkPhos  54  02-03    Liver panel trend:  TBili 0.4   /   AST 21   /   ALT 11   /   AlkP 54   /   Tptn 6.3   /   Alb 4.1    /   DBili --      02-03  TBili 0.4   /   AST 23   /   ALT 14   /   AlkP 53   /   Tptn 6.6   /   Alb 4.2    /   DBili --      02-02  TBili 0.4   /   AST 21   /   ALT 13   /   AlkP 66   /   Tptn 7.6   /   Alb 4.8    /   DBili --      02-01  TBili 0.4   /   AST 19   /   ALT 11   /   AlkP 55   /   Tptn 6.8   /   Alb 4.5    /   DBili --      01-29          Culture - Urine (collected 01 Feb 2021 04:50)  Source: .Urine Clean Catch (Midstream)  Final Report (02 Feb 2021 15:38):    No growth         Radiology: (reviewed by attending)       Gastroenterology progress note:     Patient is a 35y old  Female who presents with a chief complaint of abdominal pain (02 Feb 2021 15:34)    Admitted on: 02-01-21    We are following the patient for: Abdominal Pain, Nausea, Vomiting. Likely due to cyclical vomiting syndrome      Interval History: Patient tolerating some PO intake. Had broth, tea. Denies any vomiting episodes but nausea persists. Urinating alot overnight, had poor sleep    Patient's medical problems are improving/stable/not improving/unstable/   Prior records reviewed (Y/N): Y  History obtained from someone other than patient (Y/N):N      PAST MEDICAL & SURGICAL HISTORY:  H/O Clostridium difficile infection    History of marijuana use    Gastritis, Helicobacter pylori    Colitis    History of appendectomy        MEDICATIONS  (STANDING):  enoxaparin Injectable 40 milliGRAM(s) SubCutaneous daily  influenza   Vaccine 0.5 milliLiter(s) IntraMuscular once  metroNIDAZOLE  IVPB 500 milliGRAM(s) IV Intermittent every 12 hours  ondansetron Injectable 8 milliGRAM(s) IV Push three times a day  pantoprazole  Injectable 40 milliGRAM(s) IV Push two times a day  phenazopyridine 100 milliGRAM(s) Oral every 8 hours    MEDICATIONS  (PRN):  acetaminophen   Tablet .. 650 milliGRAM(s) Oral every 6 hours PRN Temp greater or equal to 38C (100.4F), Moderate Pain (4 - 6)  lidocaine   Patch 1 Patch Transdermal daily PRN pain      Allergies  Bactrim (Unknown)  Compazine (Other)  latex (Unknown)  penicillin (Unknown)  Reglan (Other)  sulfa drugs (Unknown)      Review of Systems:   Cardiovascular:  No Chest Pain, No Palpitations. abdominal pain resolved, now feels pressure under right breast  Respiratory:  No Cough, No Dyspnea  Gastrointestinal:  As described in HPI    Physical Examination:  T(C): 37.1 (02-03-21 @ 05:26), Max: 37.1 (02-02-21 @ 21:20)  HR: 68 (02-03-21 @ 05:26) (68 - 75)  BP: 121/63 (02-03-21 @ 05:26) (121/63 - 125/66)  RR: 20 (02-03-21 @ 05:26) (18 - 20)  SpO2: 100% (02-03-21 @ 05:26) (100% - 100%)      02-02-21 @ 07:01  -  02-03-21 @ 07:00  --------------------------------------------------------  IN: 956 mL / OUT: 1203 mL / NET: -247 mL    02-03-21 @ 07:01  -  02-03-21 @ 09:22  --------------------------------------------------------  IN: 390 mL / OUT: 0 mL / NET: 390 mL      Constitutional: No acute distress.  Respiratory:  No signs of respiratory distress. Lung sounds are clear bilaterally.  Cardiovascular:  S1 S2, Regular rate and rhythm.  Abdominal: Abdomen is soft, symmetric, and non-tender without distention. There are no visible lesions. Bowel sounds are present and normoactive in all four quadrants. No masses, hepatomegaly, or splenomegaly are noted.   Skin: No rashes, No Jaundice.        Data: (reviewed by attending)                        12.6   5.87  )-----------( 345      ( 03 Feb 2021 06:00 )             37.4     Hgb trend:  12.6  02-03-21 @ 06:00  12.7  02-02-21 @ 06:46  14.1  02-01-21 @ 04:50        02-03    137  |  100  |  3<L>  ----------------------------<  123<H>  3.6   |  22  |  0.7    Ca    8.8      03 Feb 2021 06:00  Mg     1.7     02-03    TPro  6.3  /  Alb  4.1  /  TBili  0.4  /  DBili  x   /  AST  21  /  ALT  11  /  AlkPhos  54  02-03    Liver panel trend:  TBili 0.4   /   AST 21   /   ALT 11   /   AlkP 54   /   Tptn 6.3   /   Alb 4.1    /   DBili --      02-03  TBili 0.4   /   AST 23   /   ALT 14   /   AlkP 53   /   Tptn 6.6   /   Alb 4.2    /   DBili --      02-02  TBili 0.4   /   AST 21   /   ALT 13   /   AlkP 66   /   Tptn 7.6   /   Alb 4.8    /   DBili --      02-01  TBili 0.4   /   AST 19   /   ALT 11   /   AlkP 55   /   Tptn 6.8   /   Alb 4.5    /   DBili --      01-29          Culture - Urine (collected 01 Feb 2021 04:50)  Source: .Urine Clean Catch (Midstream)  Final Report (02 Feb 2021 15:38):    No growth

## 2021-02-03 NOTE — PROGRESS NOTE ADULT - SUBJECTIVE AND OBJECTIVE BOX
Patient is a 35y old  Female who presents with a chief complaint of abdominal pain and vomiting  (2021 10:53)    Patient was seen and examined.  Abd pain resolving  Denies nausea, Vomiting  All systems reviewed .    PAST MEDICAL & SURGICAL HISTORY:  H/O Clostridium difficile infection  History of marijuana use  Gastritis, Helicobacter pylori  Colitis  History of appendectomy    Allergies  Bactrim (Unknown)  Compazine (Other)  latex (Unknown)  penicillin (Unknown)  Reglan (Other)  sulfa drugs (Unknown)    MEDICATIONS  (STANDING):  enoxaparin Injectable 40 milliGRAM(s) SubCutaneous daily  influenza   Vaccine 0.5 milliLiter(s) IntraMuscular once  metroNIDAZOLE  IVPB 500 milliGRAM(s) IV Intermittent every 12 hours  ondansetron Injectable 8 milliGRAM(s) IV Push three times a day  pantoprazole  Injectable 40 milliGRAM(s) IV Push two times a day  phenazopyridine 100 milliGRAM(s) Oral every 8 hours    MEDICATIONS  (PRN):  acetaminophen   Tablet .. 650 milliGRAM(s) Oral every 6 hours PRN Temp greater or equal to 38C (100.4F), Moderate Pain (4 - 6)  diphenhydrAMINE   Injectable 50 milliGRAM(s) IV Push daily PRN sedation  lidocaine   Patch 1 Patch Transdermal daily PRN pain    Vital Signs Last 24 Hrs  T(C): 37.1  T(F): 98.8  HR: 68  BP: 121/63  BP(mean): --  RR: 20  SpO2: 100%    O/E:  Awake, alert, not in distress.  HEENT: atraumatic, EOMI.  Chest: clear.  CVS: SIS2 +, no murmur.  P/A: Soft, BS+  CNS: non focal.  Ext: no edema feet.  Skin: no rash, no ulcers.  All systems reviewed positive findings as above.    POCT Blood Glucose.: 78 mg/dL (2021 20:41)                          12.6   5.87  )-----------( 345      ( 2021 06:00 )             37.4                         12.7   6.35  )-----------( 324      ( 2021 06:46 )             38.5     02-03    137  |  100  |  3<L>  ----------------------------<  123<H>  3.6   |  22  |  0.7      137  |  101  |  6<L>  ----------------------------<  62<L>  3.9   |  19  |  0.8    Ca    8.8      2021 06:00  Ca    8.9      2021 06:46  Mg     1.7     -    TPro  6.3  /  Alb  4.1  /  TBili  0.4  /  DBili  x   /  AST  21  /  ALT  11  /  AlkPhos  54  02-  TPro  6.6  /  Alb  4.2  /  TBili  0.4  /  DBili  x   /  AST  23  /  ALT  14  /  AlkPhos  53  02-02            Urinalysis Basic - ( 2021 22:08 )    Color: Lisbeth / Appearance: Clear / S.009 / pH: x  Gluc: x / Ketone: Moderate  / Bili: Small / Urobili: 3 mg/dL   Blood: x / Protein: Negative / Nitrite: Positive   Leuk Esterase: Negative / RBC: 2 /HPF / WBC 1 /HPF   Sq Epi: x / Non Sq Epi: 3 /HPF / Bacteria: Negative        Culture - Urine (collected 2021 04:50)  Source: .Urine Clean Catch (Midstream)  Final Report (2021 15:38):    No growth

## 2021-02-03 NOTE — DISCHARGE NOTE PROVIDER - NSDCMRMEDTOKEN_GEN_ALL_CORE_FT
Flagyl 500 mg oral tablet: 1 tab(s) orally 2 times a day for 7 days  pantoprazole 40 mg oral delayed release tablet: 1 tab(s) orally every 12 hours  Zofran 4 mg oral tablet: 1 tab(s) orally every 8 hours, As Needed

## 2021-02-04 ENCOUNTER — TRANSCRIPTION ENCOUNTER (OUTPATIENT)
Age: 36
End: 2021-02-04

## 2021-02-04 VITALS
DIASTOLIC BLOOD PRESSURE: 64 MMHG | SYSTOLIC BLOOD PRESSURE: 109 MMHG | TEMPERATURE: 97 F | HEART RATE: 71 BPM | RESPIRATION RATE: 18 BRPM

## 2021-02-04 PROCEDURE — 99239 HOSP IP/OBS DSCHRG MGMT >30: CPT

## 2021-02-04 RX ADMIN — Medication 1 MILLIGRAM(S): at 01:19

## 2021-02-04 RX ADMIN — Medication 100 MILLIGRAM(S): at 13:13

## 2021-02-04 RX ADMIN — Medication 100 MILLIGRAM(S): at 05:16

## 2021-02-04 RX ADMIN — Medication 50 MILLIGRAM(S): at 00:00

## 2021-02-04 RX ADMIN — ONDANSETRON 8 MILLIGRAM(S): 8 TABLET, FILM COATED ORAL at 13:14

## 2021-02-04 RX ADMIN — ONDANSETRON 8 MILLIGRAM(S): 8 TABLET, FILM COATED ORAL at 05:16

## 2021-02-04 RX ADMIN — PANTOPRAZOLE SODIUM 40 MILLIGRAM(S): 20 TABLET, DELAYED RELEASE ORAL at 05:16

## 2021-02-04 NOTE — DISCHARGE NOTE NURSING/CASE MANAGEMENT/SOCIAL WORK - NSDCFUADDAPPT_GEN_ALL_CORE_FT
Gastroenterology: Friday, February 26th at 2:30 pm in the [facility] at [address] [place] [address] [address]. [phone]  Gynecology: Thursday, February 11th at 8:00 am in the [facility] at [address] [place] [address] [address]. [phone]

## 2021-02-04 NOTE — DISCHARGE NOTE NURSING/CASE MANAGEMENT/SOCIAL WORK - PATIENT PORTAL LINK FT
You can access the FollowMyHealth Patient Portal offered by [facility] by registering at the following website: [email or web address]. By joining [employer]’s FollowMyHealth portal, you will also be able to view your health information using other applications (apps) compatible with our system.

## 2021-02-05 LAB
6-ACETYLMORPHINE, UR RESULT: NEGATIVE NG/ML — SIGNIFICANT CHANGE UP
6MAM UR CFM-MCNC: NEGATIVE NG/ML — SIGNIFICANT CHANGE UP
CODEINE UR CFM-MCNC: NEGATIVE NG/ML — SIGNIFICANT CHANGE UP
CODEINE, UR RESULT: NEGATIVE NG/ML — SIGNIFICANT CHANGE UP
HYDROCODONE UR QL CFM: NEGATIVE NG/ML — SIGNIFICANT CHANGE UP
HYDROCODONE, UR RESULT: NEGATIVE NG/ML — SIGNIFICANT CHANGE UP
HYDROMORPHONE UR QL CFM: NEGATIVE NG/ML — SIGNIFICANT CHANGE UP
HYDROMORPHONE, UR RESULT: NEGATIVE NG/ML — SIGNIFICANT CHANGE UP
MORPHINE UR QL CFM: 431 NG/ML — SIGNIFICANT CHANGE UP
MORPHINE, UR RESULT: 431 NG/ML — SIGNIFICANT CHANGE UP
NOROXYCODONE (OPIATES), UR RESULT: NEGATIVE NG/ML — SIGNIFICANT CHANGE UP
NOROXYCODONE UR CFM-MCNC: NEGATIVE NG/ML — SIGNIFICANT CHANGE UP
OPIATES IN-HOUSE INTERPRETATION: POSITIVE
OPIATES UR QL CFM: POSITIVE
OXYCODONE (OPIATES), UR RESULT: NEGATIVE NG/ML — SIGNIFICANT CHANGE UP
OXYCODONE UR-MCNC: NEGATIVE NG/ML — SIGNIFICANT CHANGE UP
OXYMORPHONE (OPIATES), UR RESULT: NEGATIVE NG/ML — SIGNIFICANT CHANGE UP
OXYMORPHONE UR CFM-MCNC: NEGATIVE NG/ML — SIGNIFICANT CHANGE UP

## 2021-02-08 ENCOUNTER — NON-APPOINTMENT (OUTPATIENT)
Age: 36
End: 2021-02-08

## 2021-02-11 ENCOUNTER — OUTPATIENT (OUTPATIENT)
Dept: OUTPATIENT SERVICES | Facility: HOSPITAL | Age: 36
LOS: 1 days | Discharge: HOME | End: 2021-02-11

## 2021-02-11 ENCOUNTER — APPOINTMENT (OUTPATIENT)
Dept: INTERNAL MEDICINE | Facility: CLINIC | Age: 36
End: 2021-02-11
Payer: COMMERCIAL

## 2021-02-11 DIAGNOSIS — K58.9 IRRITABLE BOWEL SYNDROME W/OUT DIARRHEA: ICD-10-CM

## 2021-02-11 DIAGNOSIS — Z90.49 ACQUIRED ABSENCE OF OTHER SPECIFIED PARTS OF DIGESTIVE TRACT: Chronic | ICD-10-CM

## 2021-02-11 DIAGNOSIS — Z00.00 ENCOUNTER FOR GENERAL ADULT MEDICAL EXAMINATION W/OUT ABNORMAL FINDINGS: ICD-10-CM

## 2021-02-11 PROCEDURE — 99203 OFFICE O/P NEW LOW 30 MIN: CPT | Mod: GC

## 2021-02-11 RX ORDER — ONDANSETRON 8 MG/1
8 TABLET, ORALLY DISINTEGRATING ORAL
Qty: 30 | Refills: 3 | Status: ACTIVE | COMMUNITY
Start: 1900-01-01 | End: 1900-01-01

## 2021-02-11 NOTE — REVIEW OF SYSTEMS
[Abdominal Pain] : abdominal pain [Nausea] : nausea [Constipation] : constipation [Vomiting] : vomiting [Frequency] : frequency [Headache] : headache [Anxiety] : anxiety [Negative] : Respiratory [Vaginal Discharge] : no vaginal discharge [Joint Pain] : no joint pain [Muscle Pain] : no muscle pain [Back Pain] : no back pain

## 2021-02-11 NOTE — ASSESSMENT
[FreeTextEntry1] : 36 yo F with PMHx of colitis, anxiety, C. diff infection, H. pylori gastritis, hx of appendectomy who presents after being discharged from the hospital on 2/3 after a three day stay for abdominal pain,associated with nausea for 2 days found to have cyclic vomiting syndrome and bacterial vaginosis\par \par #Abdominal Pain likely IBS\par Relieved with bowel movements\par hx of anxiety\par CT Abdomen negative\par -c/w Protonix 40 mg two times a day\par -c/w Zofran 8 mg three times a day with meals\par -Restarted amitriptyline\par \par # Cyclical Vomiting Syndrome\par Good appetite but very nauseas\par Only one episode of emesis since discharge\par -c/w Marijuana cessation\par -F/u Outpatient GI: Friday, February 26th at 2:30 in the [facility] \par \par #Urinary frequency\par -UA negative for UTI\par \par #Bacterial Vaginosis\par s/p Flagyl for 7 days\par No more fowl vaginal discharge\par \par #HCM\par Patient refused flu shot\par RTC 3 months with labs

## 2021-02-15 DIAGNOSIS — F12.188 CANNABIS ABUSE WITH OTHER CANNABIS-INDUCED DISORDER: ICD-10-CM

## 2021-02-15 DIAGNOSIS — Z88.8 ALLERGY STATUS TO OTHER DRUGS, MEDICAMENTS AND BIOLOGICAL SUBSTANCES: ICD-10-CM

## 2021-02-15 DIAGNOSIS — Z88.1 ALLERGY STATUS TO OTHER ANTIBIOTIC AGENTS STATUS: ICD-10-CM

## 2021-02-15 DIAGNOSIS — E83.42 HYPOMAGNESEMIA: ICD-10-CM

## 2021-02-15 DIAGNOSIS — M54.5 LOW BACK PAIN: ICD-10-CM

## 2021-02-15 DIAGNOSIS — R10.9 UNSPECIFIED ABDOMINAL PAIN: ICD-10-CM

## 2021-02-15 DIAGNOSIS — Z88.2 ALLERGY STATUS TO SULFONAMIDES: ICD-10-CM

## 2021-02-15 DIAGNOSIS — R35.0 FREQUENCY OF MICTURITION: ICD-10-CM

## 2021-02-15 DIAGNOSIS — K92.0 HEMATEMESIS: ICD-10-CM

## 2021-02-15 DIAGNOSIS — Z91.040 LATEX ALLERGY STATUS: ICD-10-CM

## 2021-02-15 DIAGNOSIS — K29.70 GASTRITIS, UNSPECIFIED, WITHOUT BLEEDING: ICD-10-CM

## 2021-02-15 DIAGNOSIS — Z88.0 ALLERGY STATUS TO PENICILLIN: ICD-10-CM

## 2021-02-15 DIAGNOSIS — R11.15 CYCLICAL VOMITING SYNDROME UNRELATED TO MIGRAINE: ICD-10-CM

## 2021-02-15 DIAGNOSIS — R39.15 URGENCY OF URINATION: ICD-10-CM

## 2021-02-15 DIAGNOSIS — N76.0 ACUTE VAGINITIS: ICD-10-CM

## 2021-02-18 DIAGNOSIS — R10.9 UNSPECIFIED ABDOMINAL PAIN: ICD-10-CM

## 2021-02-18 DIAGNOSIS — K58.9 IRRITABLE BOWEL SYNDROME WITHOUT DIARRHEA: ICD-10-CM

## 2021-02-18 DIAGNOSIS — Z00.00 ENCOUNTER FOR GENERAL ADULT MEDICAL EXAMINATION WITHOUT ABNORMAL FINDINGS: ICD-10-CM

## 2021-02-26 ENCOUNTER — APPOINTMENT (OUTPATIENT)
Dept: GASTROENTEROLOGY | Facility: CLINIC | Age: 36
End: 2021-02-26

## 2021-03-12 ENCOUNTER — TRANSCRIPTION ENCOUNTER (OUTPATIENT)
Age: 36
End: 2021-03-12

## 2021-04-26 ENCOUNTER — RX RENEWAL (OUTPATIENT)
Age: 36
End: 2021-04-26

## 2021-05-05 ENCOUNTER — RX RENEWAL (OUTPATIENT)
Age: 36
End: 2021-05-05

## 2021-08-01 ENCOUNTER — TRANSCRIPTION ENCOUNTER (OUTPATIENT)
Age: 36
End: 2021-08-01

## 2021-09-17 ENCOUNTER — TRANSCRIPTION ENCOUNTER (OUTPATIENT)
Age: 36
End: 2021-09-17

## 2021-12-23 ENCOUNTER — TRANSCRIPTION ENCOUNTER (OUTPATIENT)
Age: 36
End: 2021-12-23

## 2022-01-24 ENCOUNTER — RX RENEWAL (OUTPATIENT)
Age: 37
End: 2022-01-24

## 2022-04-12 ENCOUNTER — TRANSCRIPTION ENCOUNTER (OUTPATIENT)
Age: 37
End: 2022-04-12

## 2022-04-25 ENCOUNTER — RX RENEWAL (OUTPATIENT)
Age: 37
End: 2022-04-25

## 2022-05-30 ENCOUNTER — NON-APPOINTMENT (OUTPATIENT)
Age: 37
End: 2022-05-30

## 2022-07-12 ENCOUNTER — NON-APPOINTMENT (OUTPATIENT)
Age: 37
End: 2022-07-12

## 2022-07-15 ENCOUNTER — EMERGENCY (EMERGENCY)
Facility: HOSPITAL | Age: 37
LOS: 0 days | Discharge: HOME | End: 2022-07-15
Attending: EMERGENCY MEDICINE | Admitting: EMERGENCY MEDICINE

## 2022-07-15 VITALS
OXYGEN SATURATION: 98 % | HEART RATE: 69 BPM | DIASTOLIC BLOOD PRESSURE: 64 MMHG | RESPIRATION RATE: 18 BRPM | TEMPERATURE: 98 F | HEIGHT: 61 IN | WEIGHT: 119.93 LBS | SYSTOLIC BLOOD PRESSURE: 112 MMHG

## 2022-07-15 DIAGNOSIS — Z88.8 ALLERGY STATUS TO OTHER DRUGS, MEDICAMENTS AND BIOLOGICAL SUBSTANCES: ICD-10-CM

## 2022-07-15 DIAGNOSIS — Z88.2 ALLERGY STATUS TO SULFONAMIDES: ICD-10-CM

## 2022-07-15 DIAGNOSIS — Z88.1 ALLERGY STATUS TO OTHER ANTIBIOTIC AGENTS STATUS: ICD-10-CM

## 2022-07-15 DIAGNOSIS — Z87.19 PERSONAL HISTORY OF OTHER DISEASES OF THE DIGESTIVE SYSTEM: ICD-10-CM

## 2022-07-15 DIAGNOSIS — Z90.49 ACQUIRED ABSENCE OF OTHER SPECIFIED PARTS OF DIGESTIVE TRACT: ICD-10-CM

## 2022-07-15 DIAGNOSIS — Z86.19 PERSONAL HISTORY OF OTHER INFECTIOUS AND PARASITIC DISEASES: ICD-10-CM

## 2022-07-15 DIAGNOSIS — R35.0 FREQUENCY OF MICTURITION: ICD-10-CM

## 2022-07-15 DIAGNOSIS — R10.9 UNSPECIFIED ABDOMINAL PAIN: ICD-10-CM

## 2022-07-15 DIAGNOSIS — Z88.0 ALLERGY STATUS TO PENICILLIN: ICD-10-CM

## 2022-07-15 DIAGNOSIS — Z97.5 PRESENCE OF (INTRAUTERINE) CONTRACEPTIVE DEVICE: ICD-10-CM

## 2022-07-15 DIAGNOSIS — Z90.49 ACQUIRED ABSENCE OF OTHER SPECIFIED PARTS OF DIGESTIVE TRACT: Chronic | ICD-10-CM

## 2022-07-15 DIAGNOSIS — Z91.040 LATEX ALLERGY STATUS: ICD-10-CM

## 2022-07-15 LAB
ALBUMIN SERPL ELPH-MCNC: 4.8 G/DL — SIGNIFICANT CHANGE UP (ref 3.5–5.2)
ALP SERPL-CCNC: 59 U/L — SIGNIFICANT CHANGE UP (ref 30–115)
ALT FLD-CCNC: 16 U/L — SIGNIFICANT CHANGE UP (ref 0–41)
ANION GAP SERPL CALC-SCNC: 11 MMOL/L — SIGNIFICANT CHANGE UP (ref 7–14)
APPEARANCE UR: CLEAR — SIGNIFICANT CHANGE UP
AST SERPL-CCNC: 23 U/L — SIGNIFICANT CHANGE UP (ref 0–41)
BASOPHILS # BLD AUTO: 0.04 K/UL — SIGNIFICANT CHANGE UP (ref 0–0.2)
BASOPHILS NFR BLD AUTO: 0.6 % — SIGNIFICANT CHANGE UP (ref 0–1)
BILIRUB DIRECT SERPL-MCNC: <0.2 MG/DL — SIGNIFICANT CHANGE UP (ref 0–0.3)
BILIRUB INDIRECT FLD-MCNC: >0.2 MG/DL — SIGNIFICANT CHANGE UP (ref 0.2–1.2)
BILIRUB SERPL-MCNC: 0.4 MG/DL — SIGNIFICANT CHANGE UP (ref 0.2–1.2)
BILIRUB UR-MCNC: NEGATIVE — SIGNIFICANT CHANGE UP
BUN SERPL-MCNC: 10 MG/DL — SIGNIFICANT CHANGE UP (ref 10–20)
CALCIUM SERPL-MCNC: 9.4 MG/DL — SIGNIFICANT CHANGE UP (ref 8.5–10.1)
CHLORIDE SERPL-SCNC: 100 MMOL/L — SIGNIFICANT CHANGE UP (ref 98–110)
CO2 SERPL-SCNC: 27 MMOL/L — SIGNIFICANT CHANGE UP (ref 17–32)
COLOR SPEC: SIGNIFICANT CHANGE UP
CREAT SERPL-MCNC: 0.7 MG/DL — SIGNIFICANT CHANGE UP (ref 0.7–1.5)
DIFF PNL FLD: NEGATIVE — SIGNIFICANT CHANGE UP
EGFR: 116 ML/MIN/1.73M2 — SIGNIFICANT CHANGE UP
EOSINOPHIL # BLD AUTO: 0.07 K/UL — SIGNIFICANT CHANGE UP (ref 0–0.7)
EOSINOPHIL NFR BLD AUTO: 1.1 % — SIGNIFICANT CHANGE UP (ref 0–8)
GLUCOSE SERPL-MCNC: 84 MG/DL — SIGNIFICANT CHANGE UP (ref 70–99)
GLUCOSE UR QL: NEGATIVE — SIGNIFICANT CHANGE UP
HCG SERPL QL: NEGATIVE — SIGNIFICANT CHANGE UP
HCT VFR BLD CALC: 42.1 % — SIGNIFICANT CHANGE UP (ref 37–47)
HGB BLD-MCNC: 14.2 G/DL — SIGNIFICANT CHANGE UP (ref 12–16)
IMM GRANULOCYTES NFR BLD AUTO: 0.3 % — SIGNIFICANT CHANGE UP (ref 0.1–0.3)
KETONES UR-MCNC: NEGATIVE — SIGNIFICANT CHANGE UP
LACTATE SERPL-SCNC: 1 MMOL/L — SIGNIFICANT CHANGE UP (ref 0.7–2)
LEUKOCYTE ESTERASE UR-ACNC: NEGATIVE — SIGNIFICANT CHANGE UP
LIDOCAIN IGE QN: 16 U/L — SIGNIFICANT CHANGE UP (ref 7–60)
LYMPHOCYTES # BLD AUTO: 1.79 K/UL — SIGNIFICANT CHANGE UP (ref 1.2–3.4)
LYMPHOCYTES # BLD AUTO: 27.9 % — SIGNIFICANT CHANGE UP (ref 20.5–51.1)
MCHC RBC-ENTMCNC: 27.6 PG — SIGNIFICANT CHANGE UP (ref 27–31)
MCHC RBC-ENTMCNC: 33.7 G/DL — SIGNIFICANT CHANGE UP (ref 32–37)
MCV RBC AUTO: 81.9 FL — SIGNIFICANT CHANGE UP (ref 81–99)
MONOCYTES # BLD AUTO: 0.28 K/UL — SIGNIFICANT CHANGE UP (ref 0.1–0.6)
MONOCYTES NFR BLD AUTO: 4.4 % — SIGNIFICANT CHANGE UP (ref 1.7–9.3)
NEUTROPHILS # BLD AUTO: 4.22 K/UL — SIGNIFICANT CHANGE UP (ref 1.4–6.5)
NEUTROPHILS NFR BLD AUTO: 65.7 % — SIGNIFICANT CHANGE UP (ref 42.2–75.2)
NITRITE UR-MCNC: NEGATIVE — SIGNIFICANT CHANGE UP
NRBC # BLD: 0 /100 WBCS — SIGNIFICANT CHANGE UP (ref 0–0)
PH UR: 8 — SIGNIFICANT CHANGE UP (ref 5–8)
PLATELET # BLD AUTO: 358 K/UL — SIGNIFICANT CHANGE UP (ref 130–400)
POTASSIUM SERPL-MCNC: 4.1 MMOL/L — SIGNIFICANT CHANGE UP (ref 3.5–5)
POTASSIUM SERPL-SCNC: 4.1 MMOL/L — SIGNIFICANT CHANGE UP (ref 3.5–5)
PROT SERPL-MCNC: 7.1 G/DL — SIGNIFICANT CHANGE UP (ref 6–8)
PROT UR-MCNC: NEGATIVE — SIGNIFICANT CHANGE UP
RBC # BLD: 5.14 M/UL — SIGNIFICANT CHANGE UP (ref 4.2–5.4)
RBC # FLD: 13.9 % — SIGNIFICANT CHANGE UP (ref 11.5–14.5)
SODIUM SERPL-SCNC: 138 MMOL/L — SIGNIFICANT CHANGE UP (ref 135–146)
SP GR SPEC: 1.01 — SIGNIFICANT CHANGE UP (ref 1.01–1.03)
UROBILINOGEN FLD QL: SIGNIFICANT CHANGE UP
WBC # BLD: 6.42 K/UL — SIGNIFICANT CHANGE UP (ref 4.8–10.8)
WBC # FLD AUTO: 6.42 K/UL — SIGNIFICANT CHANGE UP (ref 4.8–10.8)

## 2022-07-15 PROCEDURE — 76830 TRANSVAGINAL US NON-OB: CPT | Mod: 26

## 2022-07-15 PROCEDURE — 74177 CT ABD & PELVIS W/CONTRAST: CPT | Mod: 26,MA

## 2022-07-15 PROCEDURE — 99285 EMERGENCY DEPT VISIT HI MDM: CPT

## 2022-07-15 RX ORDER — ONDANSETRON 8 MG/1
1 TABLET, FILM COATED ORAL
Qty: 9 | Refills: 0
Start: 2022-07-15 | End: 2022-07-17

## 2022-07-15 RX ORDER — MORPHINE SULFATE 50 MG/1
4 CAPSULE, EXTENDED RELEASE ORAL ONCE
Refills: 0 | Status: DISCONTINUED | OUTPATIENT
Start: 2022-07-15 | End: 2022-07-15

## 2022-07-15 RX ORDER — SODIUM CHLORIDE 9 MG/ML
1000 INJECTION, SOLUTION INTRAVENOUS ONCE
Refills: 0 | Status: COMPLETED | OUTPATIENT
Start: 2022-07-15 | End: 2022-07-15

## 2022-07-15 RX ORDER — ONDANSETRON 8 MG/1
4 TABLET, FILM COATED ORAL ONCE
Refills: 0 | Status: COMPLETED | OUTPATIENT
Start: 2022-07-15 | End: 2022-07-15

## 2022-07-15 RX ADMIN — SODIUM CHLORIDE 1000 MILLILITER(S): 9 INJECTION, SOLUTION INTRAVENOUS at 13:11

## 2022-07-15 RX ADMIN — ONDANSETRON 4 MILLIGRAM(S): 8 TABLET, FILM COATED ORAL at 16:33

## 2022-07-15 RX ADMIN — MORPHINE SULFATE 4 MILLIGRAM(S): 50 CAPSULE, EXTENDED RELEASE ORAL at 13:11

## 2022-07-15 RX ADMIN — ONDANSETRON 4 MILLIGRAM(S): 8 TABLET, FILM COATED ORAL at 13:11

## 2022-07-15 RX ADMIN — MORPHINE SULFATE 4 MILLIGRAM(S): 50 CAPSULE, EXTENDED RELEASE ORAL at 16:33

## 2022-07-15 NOTE — ED PROVIDER NOTE - ATTENDING CONTRIBUTION TO CARE
36-year-old woman, history of recurrent UTI complains of right flank pain over the last 3 days.  Just finished a course of Cipro that was given in an urgent care center, concern for recurrent infection again.  No fever or chills now, reports some nausea and vomiting, but no diarrhea, dysuria, or hematuria.  Vital signs, exam as noted, patient is uncomfortable, but nontoxic-appearing and afebrile.  Lungs clear, CV S1-S2, abdomen soft, no pelvic or abdominal tenderness, however there is some right-sided CVA tenderness.  Ambulatory without distress.  Will check labs, urine, reassess.

## 2022-07-15 NOTE — ED PROVIDER NOTE - NSFOLLOWUPINSTRUCTIONS_ED_ALL_ED_FT
1. FOLLOW UP WITH DR ESTEBAN IN THE NEXT 1-2 WEEKS  2. PLENTY OF FLUIDS. IBUPROFEN FOR PAIN. ZOFRAN FOR NAUSEA.  3. RETURN TO THE ED FOR PERSISTENT OR WORSENING SYMPTOMS.    Abdominal Pain    Many things can cause abdominal pain. Many times, abdominal pain is not caused by a disease and will improve without treatment. Your health care provider will do a physical exam to determine if there is a dangerous cause of your pain; blood tests and imaging may help determine the cause of your pain. However, in many cases, no cause may be found and you may need further testing as an outpatient. Monitor your abdominal pain for any changes.     SEEK IMMEDIATE MEDICAL CARE IF YOU HAVE ANY OF THE FOLLOWING SYMPTOMS: worsening abdominal pain, uncontrollable vomiting, profuse diarrhea, inability to have bowel movements or pass gas, black or bloody stools, fever accompanying chest pain or back pain, or fainting. These symptoms may represent a serious problem that is an emergency. Do not wait to see if the symptoms will go away. Get medical help right away. Call 911 and do not drive yourself to the hospital.

## 2022-07-15 NOTE — ED ADULT NURSE NOTE - OBJECTIVE STATEMENT
36 yr F c.o b/l flank pain, nausea, vomiting diarrhea since last [facility] reports back pain 10/10.

## 2022-07-15 NOTE — ED PROVIDER NOTE - PATIENT PORTAL LINK FT
You can access the FollowMyHealth Patient Portal offered by [facility] by registering at the following website: [email or web address]. By joining [employer]’s FollowMyHealth portal, you will also be able to view your health information using other applications (apps) compatible with our system.

## 2022-07-15 NOTE — ED PROVIDER NOTE - CARE PROVIDER_API CALL
[id number] [place] [address]  Phone: [phone]  Fax: [phone]  Follow Up Time: 7-10 Days

## 2022-07-15 NOTE — ED PROVIDER NOTE - CLINICAL SUMMARY MEDICAL DECISION MAKING FREE TEXT BOX
Labs ok, UA neg for infection. CT neg for stone or other acute pathology. Pelvic US negative for ovarian cyst, IUD is in place, physiologic free fluid. Pt still with some pain but nausea improved. Comfortable with discharge. Return precautions discussed.

## 2022-07-15 NOTE — ED PROVIDER NOTE - PHYSICAL EXAMINATION
VITAL SIGNS: I have reviewed nursing notes and confirm.  CONSTITUTIONAL: well-appearing female, ambulated to exam room without issues, non-toxic, NAD  SKIN: Warm dry, normal skin turgor  HEAD: NCAT  EYES: EOMI, no scleral icterus  ENT: Moist mucous membranes, normal pharynx with no erythema or exudates  NECK: Supple; non tender. Full ROM. No cervical LAD  CARD: RRR, no murmurs, rubs or gallops  RESP: clear to ausculation b/l.  No rales, rhonchi, or wheezing.  ABD: soft, non-tender, non-distended, no rebound or guarding. +R CVA tenderness  EXT: Full ROM, no bony tenderness, no pedal edema, no calf tenderness  NEURO: Normal gait.  PSYCH: Cooperative, appropriate.

## 2022-07-15 NOTE — ED PROVIDER NOTE - OBJECTIVE STATEMENT
36F PMHx recurrent UTIs presents for R flank pain x 3d. Pt reports she recently finished a course of ciprofloxacin for UTI yesterday. Reports she developed R flank pain, constant, moderate to severe intensity, sharp. Reports continued urinary frequency but denies dysuria/hematuria. Denies fever/chills, cp/sob, diarrhea, constipation. +nausea with few episodes of nbnb emesis. Pt has IUD, states she does not menstruate.

## 2022-07-16 LAB
CULTURE RESULTS: SIGNIFICANT CHANGE UP
SPECIMEN SOURCE: SIGNIFICANT CHANGE UP

## 2022-08-05 ENCOUNTER — APPOINTMENT (OUTPATIENT)
Dept: NEUROLOGY | Facility: CLINIC | Age: 37
End: 2022-08-05

## 2022-08-15 NOTE — ED ADULT NURSE NOTE - NS ED NURSE LEVEL OF CONSCIOUSNESS AFFECT
Agree with bromocriptine    Would encourage appt with pcp to discuss glucose management options   Calm

## 2022-08-17 RX ORDER — AMITRIPTYLINE HYDROCHLORIDE 10 MG/1
10 TABLET, FILM COATED ORAL
Qty: 30 | Refills: 5 | Status: ACTIVE | COMMUNITY
Start: 2021-02-11 | End: 1900-01-01

## 2022-09-08 NOTE — ED ADULT TRIAGE NOTE - NS ED TRIAGE AVPU SCALE
Alert-The patient is alert, awake and responds to voice. The patient is oriented to time, place, and person. The triage nurse is able to obtain subjective information.
(1) More than 48 hours/None

## 2022-09-28 ENCOUNTER — RX RENEWAL (OUTPATIENT)
Age: 37
End: 2022-09-28

## 2022-09-28 RX ORDER — PANTOPRAZOLE 40 MG/1
40 TABLET, DELAYED RELEASE ORAL TWICE DAILY
Qty: 60 | Refills: 3 | Status: ACTIVE | COMMUNITY
Start: 2021-05-05 | End: 1900-01-01

## 2023-01-11 ENCOUNTER — EMERGENCY (EMERGENCY)
Facility: HOSPITAL | Age: 38
LOS: 0 days | Discharge: HOME | End: 2023-01-11
Attending: EMERGENCY MEDICINE | Admitting: EMERGENCY MEDICINE
Payer: COMMERCIAL

## 2023-01-11 VITALS
RESPIRATION RATE: 17 BRPM | OXYGEN SATURATION: 98 % | HEART RATE: 86 BPM | DIASTOLIC BLOOD PRESSURE: 68 MMHG | SYSTOLIC BLOOD PRESSURE: 113 MMHG | TEMPERATURE: 98 F

## 2023-01-11 DIAGNOSIS — Z90.49 ACQUIRED ABSENCE OF OTHER SPECIFIED PARTS OF DIGESTIVE TRACT: Chronic | ICD-10-CM

## 2023-01-11 DIAGNOSIS — Z87.19 PERSONAL HISTORY OF OTHER DISEASES OF THE DIGESTIVE SYSTEM: ICD-10-CM

## 2023-01-11 DIAGNOSIS — N89.8 OTHER SPECIFIED NONINFLAMMATORY DISORDERS OF VAGINA: ICD-10-CM

## 2023-01-11 DIAGNOSIS — Z88.8 ALLERGY STATUS TO OTHER DRUGS, MEDICAMENTS AND BIOLOGICAL SUBSTANCES: ICD-10-CM

## 2023-01-11 DIAGNOSIS — Z90.49 ACQUIRED ABSENCE OF OTHER SPECIFIED PARTS OF DIGESTIVE TRACT: ICD-10-CM

## 2023-01-11 DIAGNOSIS — Z88.1 ALLERGY STATUS TO OTHER ANTIBIOTIC AGENTS STATUS: ICD-10-CM

## 2023-01-11 DIAGNOSIS — Z88.2 ALLERGY STATUS TO SULFONAMIDES: ICD-10-CM

## 2023-01-11 DIAGNOSIS — R35.0 FREQUENCY OF MICTURITION: ICD-10-CM

## 2023-01-11 DIAGNOSIS — Z91.040 LATEX ALLERGY STATUS: ICD-10-CM

## 2023-01-11 DIAGNOSIS — R39.15 URGENCY OF URINATION: ICD-10-CM

## 2023-01-11 DIAGNOSIS — Z88.0 ALLERGY STATUS TO PENICILLIN: ICD-10-CM

## 2023-01-11 LAB
APPEARANCE UR: CLEAR — SIGNIFICANT CHANGE UP
BILIRUB UR-MCNC: NEGATIVE — SIGNIFICANT CHANGE UP
COLOR SPEC: SIGNIFICANT CHANGE UP
DIFF PNL FLD: NEGATIVE — SIGNIFICANT CHANGE UP
GLUCOSE UR QL: NEGATIVE — SIGNIFICANT CHANGE UP
KETONES UR-MCNC: NEGATIVE — SIGNIFICANT CHANGE UP
LEUKOCYTE ESTERASE UR-ACNC: NEGATIVE — SIGNIFICANT CHANGE UP
NITRITE UR-MCNC: NEGATIVE — SIGNIFICANT CHANGE UP
PH UR: 6.5 — SIGNIFICANT CHANGE UP (ref 5–8)
PROT UR-MCNC: SIGNIFICANT CHANGE UP
SP GR SPEC: 1.02 — SIGNIFICANT CHANGE UP (ref 1.01–1.03)
UROBILINOGEN FLD QL: SIGNIFICANT CHANGE UP

## 2023-01-11 PROCEDURE — 99284 EMERGENCY DEPT VISIT MOD MDM: CPT

## 2023-01-11 RX ORDER — METRONIDAZOLE 500 MG
1 TABLET ORAL
Qty: 14 | Refills: 0
Start: 2023-01-11 | End: 2023-01-17

## 2023-01-11 RX ORDER — ONDANSETRON 8 MG/1
1 TABLET, FILM COATED ORAL
Qty: 0 | Refills: 0 | DISCHARGE

## 2023-01-11 NOTE — ED PROVIDER NOTE - CLINICAL SUMMARY MEDICAL DECISION MAKING FREE TEXT BOX
37-year-old female with history of UTIs, H. pylori, C. difficile, in ER with c/o urinary symptoms and vaginal d/c  for the past 3 days.  + Urinary urgency and frequency.  No hematuria.  + Thin vaginal discharge with fishy odor.  Patient states she has not been sexually active for > 2 years. no VB.  no abd pain.  no n/v/d.  no f/c.  no other complaints.  PE - nad, nc/at, eomi, perrl, op - clear, mmm, neck supple, cta b/l, no w/r/r, rrr, abd- soft, nt/nd, nabs, pelvic (chap Dr. Ibrahim): normal ext genitalia, thin white vaginal d/c, no blood in vaginal vault, no cmt, no uterine/adnexal tenderness, from x 4, no LE swelling/tenderness, A&O x 3, cn 2-12 intact, no focal motor/sensory deficits.     -UA sent and neg.  UCx sent. UCG neg.   Vaginal swab sent for gc/chlamydia, vaginiitis panel.  Pt given rx for flagyl for BV treatment.  Pt to f/u with gyn clinic, told to return to ER for increased discharge, pain, fever, vomiting, or any other new/concerning symtoms.  Pt understands and agrees with plan.

## 2023-01-11 NOTE — ED ADULT NURSE NOTE - OBJECTIVE STATEMENT
pt presents to ER c/o vaginal odor and discomfort x 3 days, with frequent urination denies itching/ buring

## 2023-01-11 NOTE — ED PROVIDER NOTE - OBJECTIVE STATEMENT
History of Present Illness  Ms. Bernal is a 40 year old female patient known to have:  - History of H Pylori Gastritis  - History of C Difficile Colitis  - Substance use: smokes marijuana  - History of recurrent UITs: 4 episodes in 2022  - Sexual History: Not sexually active for 2 years; Has an IUD; LMP 3 days ago; Last pap smear 5 months ago was normal per patient      She presented to the ED on 01/11 for evaluation of a new vaginal discharge.  History goes back to 3 days PTP when the patient's menstrual period ended and she started noticing a new thin transparent vaginal discharge that had a foul fishy smell in her under wear.  This has been associated with some suprapubic discomfort, urinary frequency, and dribbling in the absence of dysuria, itchiness, burning, fever, or chills.  She also denies any flank pain, nausea, or vomiting.  She reports she has had no previous similar episodes.  She also notes that she has not been sexually active for 2 years; Has an IUD; LMP 3 days ago; Last pap smear 5 months ago was normal per patient      On review of systems, patient denies any recent night sweats, URTI symptoms (cough, rhinorrhea, sore throat), , change in bowel movements (diarrhea or constipation), or headache.   No sick contacts.   No recent travel or exposure to recent travelers.      Upon presentation to the ED, the patient was hemodynamically stable:  Vital Signs in ED   - /68 mmHg  - HR 86 bpm  - RR 17 bpm  - T 36.7 degrees  - SaO2 98% on RA

## 2023-01-11 NOTE — ED PROVIDER NOTE - NSFOLLOWUPINSTRUCTIONS_ED_ALL_ED_FT
Bacterial Vaginosis       Bacterial vaginosis is an infection that occurs when the normal balance of bacteria in the vagina changes. This change is caused by an overgrowth of certain bacteria in the vagina. Bacterial vaginosis is the most common vaginal infection among females aged 15 to 44 years.    This condition increases the risk of sexually transmitted infections (STIs). Treatment can help reduce this risk. Treatment is very important for pregnant women because this condition can cause babies to be born early (prematurely) or at a low birth weight.      What are the causes?    This condition is caused by an increase in harmful bacteria that are normally present in small amounts in the vagina. However, the exact reason this condition develops is not known.    You cannot get bacterial vaginosis from toilet seats, bedding, swimming pools, or contact with objects around you.      What increases the risk?    The following factors may make you more likely to develop this condition:  •Having a new sexual partner or multiple sexual partners, or having unprotected sex.      •Douching.      •Having an intrauterine device (IUD).      •Smoking.      •Abusing drugs and alcohol. This may lead to riskier sexual behavior.      •Taking certain antibiotic medicines.      •Being pregnant.        What are the signs or symptoms?    Some women with this condition have no symptoms. Symptoms may include:  •Gray or white vaginal discharge. The discharge can be watery or foamy.      •A fish-like odor with discharge, especially after sex or during menstruation.      •Itching in and around the vagina.      •Burning or pain with urination.        How is this diagnosed?    This condition is diagnosed based on:  •Your medical history.      •A physical exam of the vagina.      •Checking a sample of vaginal fluid for harmful bacteria or abnormal cells.        How is this treated?    This condition is treated with antibiotic medicines. These may be given as a pill, a vaginal cream, or a medicine that is put into the vagina (suppository). If the condition comes back after treatment, a second round of antibiotics may be needed.      Follow these instructions at home:    Medicines     •Take or apply over-the-counter and prescription medicines only as told by your health care provider.      •Take or apply your antibiotic medicine as told by your health care provider. Do not stop using the antibiotic even if you start to feel better.      General instructions     •If you have a female sexual partner, tell her that you have a vaginal infection. She should follow up with her health care provider. If you have a male sexual partner, he does not need treatment.      •Avoid sexual activity until you finish treatment.      •Drink enough fluid to keep your urine pale yellow.    •Keep the area around your vagina and rectum clean.  •Wash the area daily with warm water.      •Wipe yourself from front to back after using the toilet.        •If you are breastfeeding, talk to your health care provider about continuing breastfeeding during treatment.      •Keep all follow-up visits. This is important.        How is this prevented?    Self-care     • Do not douche.      •Wash the outside of your vagina with warm water only.      •Wear cotton or cotton-lined underwear.      •Avoid wearing tight pants and pantyhose, especially during the summer.      Safe sex   •Use protection when having sex. This includes:  •Using condoms.      •Using dental dams. This is a thin layer of a material made of latex or polyurethane that protects the mouth during oral sex.        •Limit the number of sexual partners. To help prevent bacterial vaginosis, it is best to have sex with just one partner (monogamous relationship).      •Make sure you and your sexual partner are tested for STIs.      Drugs and alcohol     • Do not use any products that contain nicotine or tobacco. These products include cigarettes, chewing tobacco, and vaping devices, such as e-cigarettes. If you need help quitting, ask your health care provider.      • Do not use drugs.    • Do not drink alcohol if:  •Your health care provider tells you not to do this.      •You are pregnant, may be pregnant, or are planning to become pregnant.      •If you drink alcohol:  • Limit how much you have to 0–1 drink a day.       •Be aware of how much alcohol is in your drink. In the [place] one drink equals one 12 oz bottle of beer (355 mL), one 5 oz glass of wine (148 mL), or one 1½ oz glass of hard liquor (44 mL).          Where to find more information    •Centers for Disease Control and Prevention: www.cdc.gov      •American Sexual Health Association (ARMAAN): www.ashastd.org      •[place] Department of Health and Human Services, Office on Women's Health: www.womenshealth.gov        Contact a health care provider if:    •Your symptoms do not improve, even after treatment.      •You have more discharge or pain when urinating.      •You have a fever or chills.      •You have pain in your abdomen or pelvis.      •You have pain during sex.      •You have vaginal bleeding between menstrual periods.        Summary    •Bacterial vaginosis is a vaginal infection that occurs when the normal balance of bacteria in the vagina changes. It results from an overgrowth of certain bacteria.      •This condition increases the risk of sexually transmitted infections (STIs). Getting treated can help reduce this risk.      •Treatment is very important for pregnant women because this condition can cause babies to be born early (prematurely) or at low birth weight.      •This condition is treated with antibiotic medicines. These may be given as a pill, a vaginal cream, or a medicine that is put into the vagina (suppository).      This information is not intended to replace advice given to you by your health care provider. Make sure you discuss any questions you have with your health care provider.

## 2023-01-11 NOTE — ED PROVIDER NOTE - PATIENT PORTAL LINK FT
You can access the FollowMyHealth Patient Portal offered by [facility] by registering at the following website: [email or web address]. By joining [facility]’s FollowMyHealth portal, you will also be able to view your health information using other applications (apps) compatible with our system.

## 2023-01-11 NOTE — ED PROVIDER NOTE - PHYSICAL EXAMINATION
- Physical Exam in ED  * General Appearance: Alert, cooperative, interactive, oriented to time, place, and person, in no acute distress  * Head: Normocephalic, without obvious abnormality, atraumatic  * Lungs: Respirations unlabored, Good bilateral air entry, normal breath sounds (Clear to auscultation bilaterally, no audible wheezes, crackles, or rhonchi)  * Heart: Regular Rate and Rhythm, normal S1 and S2, no audible murmur, rub, or gallop  * Abdomen: Symmetric, non-distended, soft, non-tender, bowel sounds active all four quadrants, no masses, no organomegaly (no hepatosplenomegaly)  * Extremities: Extremities normal, atraumatic, no cyanosis, no lower extremity pitting edema bilaterally, adequate dorsalis pedis pulses  * Pulses: 2+ and symmetric all extremities  * Skin: Skin color, texture, turgor normal, no rashes or lesions  * Lymph nodes: Cervical, supraclavicular, and axillary nodes normal  * Neurologic: CNII-XII intact, normal strength, sensation and reflexes throughout

## 2023-01-12 LAB
C TRACH RRNA SPEC QL NAA+PROBE: SIGNIFICANT CHANGE UP
N GONORRHOEA RRNA SPEC QL NAA+PROBE: SIGNIFICANT CHANGE UP
SPECIMEN SOURCE: SIGNIFICANT CHANGE UP

## 2023-01-13 LAB
CULTURE RESULTS: SIGNIFICANT CHANGE UP
SPECIMEN SOURCE: SIGNIFICANT CHANGE UP

## 2023-01-14 LAB
A VAGINAE DNA VAG QL NAA+PROBE: ABNORMAL
BVAB2 DNA VAG QL NAA+PROBE: ABNORMAL
C ALBICANS DNA VAG QL NAA+PROBE: NEGATIVE — SIGNIFICANT CHANGE UP
C GLABRATA DNA VAG QL NAA+PROBE: NEGATIVE — SIGNIFICANT CHANGE UP
C KRUSEI DNA VAG QL NAA+PROBE: NEGATIVE — SIGNIFICANT CHANGE UP
C LUSITANIAE DNA VAG QL NAA+PROBE: NEGATIVE — SIGNIFICANT CHANGE UP
C TRACH RRNA SPEC QL NAA+PROBE: NEGATIVE — SIGNIFICANT CHANGE UP
MEGA1 DNA VAG QL NAA+PROBE: ABNORMAL
N GONORRHOEA RRNA SPEC QL NAA+PROBE: NEGATIVE — SIGNIFICANT CHANGE UP
T VAGINALIS RRNA SPEC QL NAA+PROBE: NEGATIVE — SIGNIFICANT CHANGE UP

## 2023-03-13 ENCOUNTER — RX RENEWAL (OUTPATIENT)
Age: 38
End: 2023-03-13

## 2023-03-15 NOTE — ED PROVIDER NOTE - NS ED ATTENDING STATEMENT MOD
"Clinic Note    Lj Angulo is a 7 y.o. male     Chief Complaint:   Chief Complaint   Patient presents with    Cough     Since Sunday    Nasal Congestion     Since Sunday        Subjective:    Patient comes in today with mom and sibling. Mom reports cough and runny nose. Denies fever. Denies body aches. Denies sore throat. Symptoms X 3 days.     Cough  Associated symptoms include rhinorrhea. Pertinent negatives include no ear pain, fever, headaches, sore throat or shortness of breath.      Allergies:   Review of patient's allergies indicates:  No Known Allergies     Past Medical History:  History reviewed. No pertinent past medical history.     Current Medications:    Current Outpatient Medications:     brompheniramin-phenylephrin-DM (RYNEX DM) 1-2.5-5 mg/5 mL Soln, Take 5 mLs by mouth every 6 (six) hours as needed., Disp: 120 mL, Rfl: 0    ondansetron (ZOFRAN) 4 mg/5 mL solution, Take 5 mLs (4 mg total) by mouth 2 (two) times daily as needed for Nausea. (Patient not taking: Reported on 3/15/2023), Disp: 30 mL, Rfl: 0       Review of Systems   Constitutional:  Negative for fever.   HENT:  Positive for nasal congestion and rhinorrhea. Negative for ear discharge, ear pain and sore throat.    Respiratory:  Positive for cough. Negative for shortness of breath.    Gastrointestinal:  Negative for abdominal pain.   Neurological:  Negative for headaches.        Objective:    Pulse 88   Temp 97.7 °F (36.5 °C) (Skin)   Resp 20   Ht 3' 9" (1.143 m)   Wt 19.3 kg (42 lb 9.6 oz)   SpO2 99%   BMI 14.79 kg/m²      Physical Exam  Constitutional:       General: He is active.   HENT:      Nose: Congestion and rhinorrhea present. Rhinorrhea is clear.      Right Turbinates: Pale.      Left Turbinates: Pale.      Mouth/Throat:      Pharynx: No oropharyngeal exudate or posterior oropharyngeal erythema.   Eyes:      Extraocular Movements: Extraocular movements intact.   Cardiovascular:      Rate and Rhythm: Normal rate and regular " rhythm.      Pulses: Normal pulses.      Heart sounds: Normal heart sounds.   Pulmonary:      Effort: Pulmonary effort is normal.      Breath sounds: Normal breath sounds.   Musculoskeletal:      Cervical back: Neck supple.   Lymphadenopathy:      Cervical: No cervical adenopathy.   Neurological:      Mental Status: He is alert and oriented for age.        Assessment and Plan:    1. Cough, unspecified type    2. Rhinitis, unspecified type         Cough, unspecified type  -     brompheniramin-phenylephrin-DM (RYNEX DM) 1-2.5-5 mg/5 mL Soln; Take 5 mLs by mouth every 6 (six) hours as needed.  Dispense: 120 mL; Refill: 0    Rhinitis, unspecified type  -     brompheniramin-phenylephrin-DM (RYNEX DM) 1-2.5-5 mg/5 mL Soln; Take 5 mLs by mouth every 6 (six) hours as needed.  Dispense: 120 mL; Refill: 0          There are no Patient Instructions on file for this visit.   Follow up if symptoms worsen or fail to improve.      I have personally seen and examined this patient.  I have fully participated in the care of this patient. I have reviewed all pertinent clinical information, including history, physical exam, plan and the Resident’s note and agree except as noted.

## 2023-03-27 NOTE — DISCHARGE NOTE NURSING/CASE MANAGEMENT/SOCIAL WORK - NSDCPETBCESMAN_GEN_ALL_CORE
If you are a smoker, it is important for your health to stop smoking. Please be aware that second hand smoke is also harmful.
My signature below certifies that the above stated patient is homebound and upon completion of the Face-To-Face encounter, has the need for intermittent skilled nursing, physical therapy and/or speech or occupational therapy services in their home for their current diagnosis as outlined in their initial plan of care. These services will continue to be monitored by myself or another physician.

## 2023-05-29 NOTE — ED ADULT NURSE NOTE - CAS ELECT INFOMATION PROVIDED
Patient discharged by provider with instructions.  Pt iv d/c by RN.  Patient leaving ED ambulatory./DC instructions
Patient/Caregiver provided printed discharge information.

## 2023-06-06 NOTE — ED PROVIDER NOTE - NSICDXPASTSURGICALHX_GEN_ALL_CORE_FT
PAST SURGICAL HISTORY:  History of appendectomy      Niacinamide Counseling: I recommended taking niacin or niacinamide, also know as vitamin B3, twice daily. Recent evidence suggests that taking vitamin B3 (500 mg twice daily) can reduce the risk of actinic keratoses and non-melanoma skin cancers. Side effects of vitamin B3 include flushing and headache.

## 2023-08-31 ENCOUNTER — EMERGENCY (EMERGENCY)
Facility: HOSPITAL | Age: 38
LOS: 0 days | Discharge: HOME | End: 2023-09-01
Attending: EMERGENCY MEDICINE
Payer: COMMERCIAL

## 2023-08-31 VITALS
SYSTOLIC BLOOD PRESSURE: 109 MMHG | HEART RATE: 77 BPM | TEMPERATURE: 98 F | HEIGHT: 69 IN | OXYGEN SATURATION: 100 % | RESPIRATION RATE: 19 BRPM | WEIGHT: 132.72 LBS | DIASTOLIC BLOOD PRESSURE: 74 MMHG

## 2023-08-31 DIAGNOSIS — Z90.49 ACQUIRED ABSENCE OF OTHER SPECIFIED PARTS OF DIGESTIVE TRACT: Chronic | ICD-10-CM

## 2023-08-31 DIAGNOSIS — M54.2 CERVICALGIA: ICD-10-CM

## 2023-08-31 DIAGNOSIS — Z88.2 ALLERGY STATUS TO SULFONAMIDES: ICD-10-CM

## 2023-08-31 DIAGNOSIS — R20.2 PARESTHESIA OF SKIN: ICD-10-CM

## 2023-08-31 DIAGNOSIS — Z88.8 ALLERGY STATUS TO OTHER DRUGS, MEDICAMENTS AND BIOLOGICAL SUBSTANCES STATUS: ICD-10-CM

## 2023-08-31 DIAGNOSIS — Z88.1 ALLERGY STATUS TO OTHER ANTIBIOTIC AGENTS STATUS: ICD-10-CM

## 2023-08-31 DIAGNOSIS — Z87.19 PERSONAL HISTORY OF OTHER DISEASES OF THE DIGESTIVE SYSTEM: ICD-10-CM

## 2023-08-31 DIAGNOSIS — M25.512 PAIN IN LEFT SHOULDER: ICD-10-CM

## 2023-08-31 DIAGNOSIS — Z88.0 ALLERGY STATUS TO PENICILLIN: ICD-10-CM

## 2023-08-31 DIAGNOSIS — Z91.040 LATEX ALLERGY STATUS: ICD-10-CM

## 2023-08-31 PROCEDURE — 96372 THER/PROPH/DIAG INJ SC/IM: CPT

## 2023-08-31 PROCEDURE — 99284 EMERGENCY DEPT VISIT MOD MDM: CPT

## 2023-08-31 PROCEDURE — 99283 EMERGENCY DEPT VISIT LOW MDM: CPT | Mod: 25

## 2023-08-31 RX ORDER — KETOROLAC TROMETHAMINE 30 MG/ML
30 SYRINGE (ML) INJECTION ONCE
Refills: 0 | Status: DISCONTINUED | OUTPATIENT
Start: 2023-08-31 | End: 2023-08-31

## 2023-08-31 RX ORDER — METHOCARBAMOL 500 MG/1
1000 TABLET, FILM COATED ORAL ONCE
Refills: 0 | Status: COMPLETED | OUTPATIENT
Start: 2023-08-31 | End: 2023-08-31

## 2023-08-31 RX ORDER — LIDOCAINE 4 G/100G
1 CREAM TOPICAL ONCE
Refills: 0 | Status: COMPLETED | OUTPATIENT
Start: 2023-08-31 | End: 2023-08-31

## 2023-08-31 RX ORDER — DIAZEPAM 5 MG
2 TABLET ORAL ONCE
Refills: 0 | Status: DISCONTINUED | OUTPATIENT
Start: 2023-08-31 | End: 2023-09-01

## 2023-08-31 RX ADMIN — METHOCARBAMOL 1000 MILLIGRAM(S): 500 TABLET, FILM COATED ORAL at 23:01

## 2023-08-31 RX ADMIN — Medication 30 MILLIGRAM(S): at 23:31

## 2023-08-31 RX ADMIN — Medication 30 MILLIGRAM(S): at 23:01

## 2023-08-31 NOTE — ED PROVIDER NOTE - PATIENT PORTAL LINK FT
You can access the FollowMyHealth Patient Portal offered by [facility] by registering at the following website: [email or web address]. By joining [facility]’s FollowMyHealth portal, you will also be able to view your health information using other applications (apps) compatible with our system.

## 2023-08-31 NOTE — ED PROVIDER NOTE - NSFOLLOWUPCLINICS_GEN_ALL_ED_FT
Neurosurgery at [facility]  Neurosurgery  [address] [place] [address], [address]  [place] [address]  Phone: [phone]  Fax:

## 2023-08-31 NOTE — ED ADULT TRIAGE NOTE - CHIEF COMPLAINT QUOTE
"I think I slept the wrong way, I've been having neck pain x 3 days and I can't move it, when I get sharp pains, my hands get numb."

## 2023-08-31 NOTE — ED PROVIDER NOTE - PHYSICAL EXAMINATION
VITAL SIGNS: I have reviewed nursing notes and confirm.  CONSTITUTIONAL:  in no acute distress.  SKIN: Skin exam is warm and dry, no acute rash.  HEAD: Normocephalic; atraumatic.  EYES: PERRL, EOM intact; conjunctiva and sclera clear.  ENT: No nasal discharge; airway clear  NECK: Supple; tender to left paraspinal region  CARD: S1, S2 normal; no murmurs, gallops, or rubs. Regular rate and rhythm.  RESP: No wheezes, rales or rhonchi. Speaking in full sentences.   ABD:  soft; non-distended; non-tender; No rebound or guarding. No CVA tenderness.  EXT: decreased ROM to left arm, ttp of shoulder, strength/ sensation intact,  No clubbing, cyanosis or edema.  NEURO: Alert, oriented. Grossly unremarkable. No focal deficits.

## 2023-08-31 NOTE — ED PROVIDER NOTE - NS ED ATTENDING STATEMENT MOD
This was a shared visit with the FRANCOISE. I reviewed and verified the documentation and independently performed the documented:

## 2023-08-31 NOTE — ED PROVIDER NOTE - NSFOLLOWUPINSTRUCTIONS_ED_ALL_ED_FT
Please follow up with neurosurgery in 1-3 days     **** Our Emergency Department Referral Coordinators will be reaching out to you in the next 24-48 hours from 9:00am to 5:00pm with a follow up appointment. Please expect a phone call from the hospital in that time frame. If you do not receive a call or if you have any questions or concerns, you can reach them at [phone]     Acute Neck Pain    WHAT YOU NEED TO KNOW:    Acute neck pain starts suddenly, increases quickly, and goes away in a few days. The pain may come and go, or be worse with certain movements. The pain may be only in your neck, or it may move to your arms, back, or shoulders. You may also have pain that starts in another body area and moves to your neck. Vertebral Column         DISCHARGE INSTRUCTIONS:    Return to the emergency department if:     You have an injury that causes neck pain and shooting pain down your arms or legs.      Your neck pain suddenly becomes severe.      You have neck pain along with numbness, tingling, or weakness in your arms or legs.      You have a stiff neck, a headache, and a fever.    Contact your healthcare provider if:     You have new or worsening symptoms.      Your symptoms continue even after treatment.      You have questions or concerns about your condition or care.    Medicines:     NSAIDs, such as ibuprofen, help decrease swelling, pain, and fever. This medicine is available without a doctor's order. Ask your healthcare provider which medicine to take and how often to take it. Follow directions. NSAIDs can cause stomach bleeding or kidney problems if not taken correctly. If you take blood thinner medicine, always ask if NSAIDs are safe for you.      Acetaminophen helps decrease pain and fever. Ask your healthcare provider how much to take and how often to take it. Follow directions. Acetaminophen can cause liver damage if not taken correctly.      Steroid medicine may be used to reduce inflammation. This can help relieve pain caused by swelling.      Take your medicine as directed. Contact your healthcare provider if you think your medicine is not helping or if you have side effects. Tell him or her if you are allergic to any medicine. Keep a list of the medicines, vitamins, and herbs you take. Include the amounts, and when and why you take them. Bring the list or the pill bottles to follow-up visits. Carry your medicine list with you in case of an emergency.    Manage or prevent acute neck pain:     Rest your neck as directed. Do not make sudden movements, such as turning your head quickly. Your healthcare provider may recommend you wear a cervical collar for a short time. The collar will prevent you from moving your head. This will give your neck time to heal if an injury is causing your neck pain. Ask your healthcare provider when you can return to sports or other normal daily activities.      Apply heat as directed. Heat helps relieve pain and swelling. Use a heat wrap, or soak a small towel in warm water. Wring out the extra water. Apply the heat wrap or towel for 20 minutes every hour, or as directed.      Apply ice as directed. Ice helps relieve pain and swelling, and can help prevent tissue damage. Use an ice pack, or put ice in a bag. Cover the ice pack or back with a towel before you apply it to your neck. Apply the ice pack or ice for 15 minutes every hour, or as directed. Your healthcare provider can tell you how often to apply ice.      Do neck exercises as directed. Neck exercises help strengthen the muscles and increase range of motion. Your healthcare provider will tell you which exercises are right for you. He may give you instructions, or he may recommend that you work with a physical therapist. Your healthcare provider or therapist can make sure you are doing the exercises correctly.       Maintain good posture. Try to keep your head and shoulders lifted when you sit. If you work in front of a computer, make sure the monitor is at the right level. You should not need to look up down to see the screen. You should also not have to lean forward to be able to read what is on the screen. Make sure your keyboard, mouse, and other computer items are placed where you do not have to extend your shoulder to reach them. Get up often if you work in front of a computer or sit for long periods of time. Stretch or walk around to keep your neck muscles loose.    Follow up with your healthcare provider as directed: Your healthcare provider may refer you to a specialist if your pain does not get better with treatment. Write down your questions so you remember to ask them during your visits.       © Copyright [employer] 2019 All illustrations and images included in CareNotes are the copyrighted property of [email or web address] or [employer].

## 2023-08-31 NOTE — ED PROVIDER NOTE - CLINICAL SUMMARY MEDICAL DECISION MAKING FREE TEXT BOX
37yoF with no sig pmh here with neck pain for 2 days. woke up with it yesterday on L side and worse when turns to L. can barely turn due to pain. pain and tingling radiate to L arm with movment. No trauma, no hx of DJD, no spinal surgeries, no recent f/c/s, no unintentional weight loss or any new weight loss, no IV drug use, no hx of malignancy, not worse any any particular time of day. Denies UE/LE weakness, saddle anesthesia, bowel/bladder incontinence or retention.   on exam, nontoxic, vs noted   uncomfortable and guarding L neck. +ttp L paracervical area and SCM. no midline ttp or swelling. able to range to R slightly, limied ROM to L. no meningismius  Motor function intact in distributions of the radial (5/5 strength in extension of the 1st digit), ulnar (finger thumb abduction), and median nerves (5/5 opposition 1st-2nd fingers). Sensation intact in radial (dorsal web between 1st and 2nd digits), median (tip of the 2nd digit), and ulnar (tip of the 5th digit). Radial and ulnar pulse 2+   Back: midline, nontender, no stepoffs; no CVA ttp  strength 5/5 in b/l hip flexion/extension, knee flexion/extension, ankle flexion/extension, great toe flexion/extension, sensation grossly intact, b/l achilles/patellar DTRs 2+, 2+ b/l DPs/PTs. Neg straight leg b/l   given ED treatment and sig improvement in symptoms and able to range neck. remains NV intact w/o signs of cord compression. In my opinion, based on current evaluation and results, an acute medical or surgical emergency does not appear to be occurring at this time and I feel that the pt is stable for further outpatient work up and/or treatment. Return precautions discussed. refer to neurosurgery.

## 2023-08-31 NOTE — ED PROVIDER NOTE - OBJECTIVE STATEMENT
37-year-old female history of IBS presenting to ED for evaluation of left-sided neck pain with sharp shooting pains radiating down left arm.  States that the pain started after she woke up a day and a half ago.  Believes that she slept wrong and since has not been able to range her neck without feeling symptoms.  States she took ibuprofen and Flexeril this morning which mildly helped symptoms.  Denies trauma to the neck, HA, blurry vision, N, V, CP, SOB

## 2023-09-01 ENCOUNTER — INPATIENT (INPATIENT)
Facility: HOSPITAL | Age: 38
LOS: 1 days | Discharge: HOME | DRG: 74 | End: 2023-09-03
Attending: STUDENT IN AN ORGANIZED HEALTH CARE EDUCATION/TRAINING PROGRAM | Admitting: STUDENT IN AN ORGANIZED HEALTH CARE EDUCATION/TRAINING PROGRAM
Payer: COMMERCIAL

## 2023-09-01 VITALS
TEMPERATURE: 99 F | HEART RATE: 83 BPM | SYSTOLIC BLOOD PRESSURE: 125 MMHG | WEIGHT: 128.53 LBS | DIASTOLIC BLOOD PRESSURE: 82 MMHG | OXYGEN SATURATION: 100 % | RESPIRATION RATE: 18 BRPM | HEIGHT: 69 IN

## 2023-09-01 VITALS
SYSTOLIC BLOOD PRESSURE: 105 MMHG | HEART RATE: 70 BPM | RESPIRATION RATE: 18 BRPM | TEMPERATURE: 97 F | DIASTOLIC BLOOD PRESSURE: 66 MMHG | OXYGEN SATURATION: 100 %

## 2023-09-01 DIAGNOSIS — M54.2 CERVICALGIA: ICD-10-CM

## 2023-09-01 DIAGNOSIS — Z90.49 ACQUIRED ABSENCE OF OTHER SPECIFIED PARTS OF DIGESTIVE TRACT: Chronic | ICD-10-CM

## 2023-09-01 PROCEDURE — 99223 1ST HOSP IP/OBS HIGH 75: CPT

## 2023-09-01 PROCEDURE — 80053 COMPREHEN METABOLIC PANEL: CPT

## 2023-09-01 PROCEDURE — 99285 EMERGENCY DEPT VISIT HI MDM: CPT

## 2023-09-01 PROCEDURE — 84702 CHORIONIC GONADOTROPIN TEST: CPT

## 2023-09-01 PROCEDURE — 83735 ASSAY OF MAGNESIUM: CPT

## 2023-09-01 PROCEDURE — 85027 COMPLETE CBC AUTOMATED: CPT

## 2023-09-01 PROCEDURE — 99285 EMERGENCY DEPT VISIT HI MDM: CPT | Mod: 25

## 2023-09-01 PROCEDURE — 36415 COLL VENOUS BLD VENIPUNCTURE: CPT

## 2023-09-01 PROCEDURE — A9579: CPT

## 2023-09-01 PROCEDURE — 86140 C-REACTIVE PROTEIN: CPT

## 2023-09-01 PROCEDURE — 72156 MRI NECK SPINE W/O & W/DYE: CPT

## 2023-09-01 PROCEDURE — 72040 X-RAY EXAM NECK SPINE 2-3 VW: CPT

## 2023-09-01 PROCEDURE — 85652 RBC SED RATE AUTOMATED: CPT

## 2023-09-01 RX ORDER — LIDOCAINE 4 G/100G
1 CREAM TOPICAL
Qty: 2 | Refills: 0
Start: 2023-09-01 | End: 2023-09-10

## 2023-09-01 RX ORDER — DIAZEPAM 5 MG
2.5 TABLET ORAL ONCE
Refills: 0 | Status: DISCONTINUED | OUTPATIENT
Start: 2023-09-01 | End: 2023-09-01

## 2023-09-01 RX ORDER — DIAZEPAM 5 MG
5 TABLET ORAL ONCE
Refills: 0 | Status: DISCONTINUED | OUTPATIENT
Start: 2023-09-01 | End: 2023-09-01

## 2023-09-01 RX ORDER — METHOCARBAMOL 500 MG/1
2 TABLET, FILM COATED ORAL
Qty: 10 | Refills: 0
Start: 2023-09-01 | End: 2023-09-05

## 2023-09-01 RX ADMIN — LIDOCAINE 1 PATCH: 4 CREAM TOPICAL at 00:12

## 2023-09-01 RX ADMIN — Medication 5 MILLIGRAM(S): at 00:23

## 2023-09-01 NOTE — ED ADULT NURSE NOTE - NSFALLUNIVINTERV_ED_ALL_ED
Bed/Stretcher in lowest position, wheels locked, appropriate side rails in place/Call bell, personal items and telephone in reach/Instruct patient to call for assistance before getting out of bed/chair/stretcher/Non-slip footwear applied when patient is off stretcher/[place] to call system/Physically safe environment - no spills, clutter or unnecessary equipment/Purposeful proactive rounding/Room/bathroom lighting operational, light cord in reach

## 2023-09-01 NOTE — ED PROVIDER NOTE - PHYSICAL EXAMINATION
VITAL SIGNS: I have reviewed nursing notes and confirm.  CONSTITUTIONAL:  in no acute distress.  SKIN: Skin exam is warm and dry, no acute rash.  HEAD: Normocephalic  EYES: PERRL, EOM intact; conjunctiva and sclera clear.  ENT: No nasal discharge; airway clear  NECK: Supple; tender to left paraspinal region  ABD:  soft; non-distended; non-tender  EXT: decreased ROM to left arm, ttp of shoulder, strength/ sensation intact,  No clubbing, cyanosis or edema.  NEURO: Alert, oriented. Grossly unremarkable. No focal deficits.

## 2023-09-01 NOTE — ED ADULT TRIAGE NOTE - CHIEF COMPLAINT QUOTE
" I have pain in my neck x 3 days, it hurts to move." Pt was admitted in the floor in 4B but she went down/outside this evening and the floor doesn't want to take her back until re-evaluated from ED. Undecided if wants to stay, otherwise just wanted prescriptions. " I have pain in my neck/shoulder  x 3 days, it hurts to move." Pt was admitted in the floor in 4B but she went down/outside this evening and the floor doesn't want to take her back until re-evaluated from ED. Undecided if wants to stay, otherwise just wanted prescriptions.

## 2023-09-01 NOTE — ED ADULT NURSE NOTE - CHIEF COMPLAINT QUOTE
" I have pain in my neck/shoulder  x 3 days, it hurts to move." Pt was admitted in the floor in 4B but she went down/outside this evening and the floor doesn't want to take her back until re-evaluated from ED. Undecided if wants to stay, otherwise just wanted prescriptions.

## 2023-09-01 NOTE — ED PROVIDER NOTE - WET READ LAUNCH FT
Decrease LT4 to  150 mcg daily  Repeat TSH in six weeks      Rules:  · Count every calorie every day  · Limit sweets to one day per month  · Limit chips/crackers/pretzels/nuts to 100 delores/day  · Eliminate all sugar sweetened beverages (including fruit juice)  · Limit alcohol to one drink per week  · Limit restaurants (including fast food and food from a convenience store) to one time every two weeks    Requirements:  · Make sure protein intake is at least 65 grams per day (do not count protein every day; instead spot check your intake every 2-3 weeks and make sure what you think you are getting is close to accurate; consider using a protein shake if needed; these are in the pharmacy section of the stores, not the grocery section; Premier, Pure Protein and Fairlife are relatively inexpensive and taste good to most patients; other options are Nectar, Boost Max, Ensure Max, BeneProtein and GNC lean (which is lactose-free); Nectar fruit, Premier Protein Clear, IsoPure Protein Drink, and Protein 2 O are water-based options; Novomer (or CRESCEL, which is cheaper and is ordered on [employer]) and the [employer] protein bars can also be used, but have less protein in them )  · Make sure that fiber intake is at least 22 grams per day. Do this by either eating 12 tablespoons of the original, plain Fiber One cereal every day or 4 tablespoons of wheat dextrin powder (Benefiber or a generic brand) every day. Work up to this amount slowly by starting with only one-eighth to one-fourth of the target amount and then adding another one-eighth to one-fourth every one or two weeks until reaching the target. · Take one multivitamin every day    Targets:  · Limit calorie intake to 1500 calories/day  · Walk 30 minutes daily  · Avoid eating 2 hours within bedtime.      Tips:  · Do not eat outside of the dining room or the kitchen  · Do not eat while watching TV, videos, working on the computer or using a smart phone  · Do not eat food out of a multi-serving bag or container.     Return to see me six weeks   Have labs drawn fasting a few days prior to the appt There are no Wet Read(s) to document.

## 2023-09-01 NOTE — ED ADULT NURSE NOTE - OBJECTIVE STATEMENT
pt is 38 y/o female returned to ed after eloping from 4b. pt wants to be readmitted for neck pain x3 days

## 2023-09-01 NOTE — ED PROVIDER NOTE - OBJECTIVE STATEMENT
37-year-old female with PMH of IBS presenting to ED for 3 days of atraumatic sharp, left-sided neck pain radiating from the occiput to her posterior left shoulder. Patient was admitted to the hospital earlier today but stepped out briefly and would like to be readmitted. Denies any new symptoms.

## 2023-09-01 NOTE — ED PROVIDER NOTE - CLINICAL SUMMARY MEDICAL DECISION MAKING FREE TEXT BOX
patient presenting with ongoing atraumatic neck pain x3 days. was admitted earlier today but briefly stepped out. discussed with mar regarding readmission. patient agreeable to readmission. evaluated and clinically stable.

## 2023-09-01 NOTE — PATIENT PROFILE ADULT - FALL HARM RISK - HARM RISK INTERVENTIONS

## 2023-09-02 LAB
ALBUMIN SERPL ELPH-MCNC: 3.7 G/DL — SIGNIFICANT CHANGE UP (ref 3.5–5.2)
ALBUMIN SERPL ELPH-MCNC: 4 G/DL — SIGNIFICANT CHANGE UP (ref 3.5–5.2)
ALP SERPL-CCNC: 47 U/L — SIGNIFICANT CHANGE UP (ref 30–115)
ALP SERPL-CCNC: 51 U/L — SIGNIFICANT CHANGE UP (ref 30–115)
ALT FLD-CCNC: 13 U/L — SIGNIFICANT CHANGE UP (ref 0–41)
ALT FLD-CCNC: 14 U/L — SIGNIFICANT CHANGE UP (ref 0–41)
ANION GAP SERPL CALC-SCNC: 10 MMOL/L — SIGNIFICANT CHANGE UP (ref 7–14)
ANION GAP SERPL CALC-SCNC: 13 MMOL/L — SIGNIFICANT CHANGE UP (ref 7–14)
AST SERPL-CCNC: 15 U/L — SIGNIFICANT CHANGE UP (ref 0–41)
AST SERPL-CCNC: 17 U/L — SIGNIFICANT CHANGE UP (ref 0–41)
BILIRUB SERPL-MCNC: 0.5 MG/DL — SIGNIFICANT CHANGE UP (ref 0.2–1.2)
BILIRUB SERPL-MCNC: 0.5 MG/DL — SIGNIFICANT CHANGE UP (ref 0.2–1.2)
BUN SERPL-MCNC: 10 MG/DL — SIGNIFICANT CHANGE UP (ref 10–20)
BUN SERPL-MCNC: 9 MG/DL — LOW (ref 10–20)
CALCIUM SERPL-MCNC: 8.9 MG/DL — SIGNIFICANT CHANGE UP (ref 8.4–10.5)
CALCIUM SERPL-MCNC: 9 MG/DL — SIGNIFICANT CHANGE UP (ref 8.4–10.5)
CHLORIDE SERPL-SCNC: 103 MMOL/L — SIGNIFICANT CHANGE UP (ref 98–110)
CHLORIDE SERPL-SCNC: 104 MMOL/L — SIGNIFICANT CHANGE UP (ref 98–110)
CO2 SERPL-SCNC: 21 MMOL/L — SIGNIFICANT CHANGE UP (ref 17–32)
CO2 SERPL-SCNC: 23 MMOL/L — SIGNIFICANT CHANGE UP (ref 17–32)
CREAT SERPL-MCNC: 0.7 MG/DL — SIGNIFICANT CHANGE UP (ref 0.7–1.5)
CREAT SERPL-MCNC: 0.7 MG/DL — SIGNIFICANT CHANGE UP (ref 0.7–1.5)
CRP SERPL-MCNC: <3 MG/L — SIGNIFICANT CHANGE UP
EGFR: 116 ML/MIN/1.73M2 — SIGNIFICANT CHANGE UP
EGFR: 116 ML/MIN/1.73M2 — SIGNIFICANT CHANGE UP
ERYTHROCYTE [SEDIMENTATION RATE] IN BLOOD: 2 MM/HR — SIGNIFICANT CHANGE UP (ref 0–20)
ERYTHROCYTE [SEDIMENTATION RATE] IN BLOOD: 2 MM/HR — SIGNIFICANT CHANGE UP (ref 0–20)
GLUCOSE SERPL-MCNC: 91 MG/DL — SIGNIFICANT CHANGE UP (ref 70–99)
GLUCOSE SERPL-MCNC: 92 MG/DL — SIGNIFICANT CHANGE UP (ref 70–99)
HCG SERPL-ACNC: <1 MIU/ML — SIGNIFICANT CHANGE UP
HCT VFR BLD CALC: 36 % — LOW (ref 37–47)
HCT VFR BLD CALC: 37.6 % — SIGNIFICANT CHANGE UP (ref 37–47)
HGB BLD-MCNC: 12.1 G/DL — SIGNIFICANT CHANGE UP (ref 12–16)
HGB BLD-MCNC: 12.7 G/DL — SIGNIFICANT CHANGE UP (ref 12–16)
MCHC RBC-ENTMCNC: 27.6 PG — SIGNIFICANT CHANGE UP (ref 27–31)
MCHC RBC-ENTMCNC: 27.9 PG — SIGNIFICANT CHANGE UP (ref 27–31)
MCHC RBC-ENTMCNC: 33.6 G/DL — SIGNIFICANT CHANGE UP (ref 32–37)
MCHC RBC-ENTMCNC: 33.8 G/DL — SIGNIFICANT CHANGE UP (ref 32–37)
MCV RBC AUTO: 81.7 FL — SIGNIFICANT CHANGE UP (ref 81–99)
MCV RBC AUTO: 82.9 FL — SIGNIFICANT CHANGE UP (ref 81–99)
NRBC # BLD: 0 /100 WBCS — SIGNIFICANT CHANGE UP (ref 0–0)
NRBC # BLD: 0 /100 WBCS — SIGNIFICANT CHANGE UP (ref 0–0)
PLATELET # BLD AUTO: 292 K/UL — SIGNIFICANT CHANGE UP (ref 130–400)
PLATELET # BLD AUTO: 318 K/UL — SIGNIFICANT CHANGE UP (ref 130–400)
PMV BLD: 9.3 FL — SIGNIFICANT CHANGE UP (ref 7.4–10.4)
PMV BLD: 9.9 FL — SIGNIFICANT CHANGE UP (ref 7.4–10.4)
POTASSIUM SERPL-MCNC: 3.8 MMOL/L — SIGNIFICANT CHANGE UP (ref 3.5–5)
POTASSIUM SERPL-MCNC: 4 MMOL/L — SIGNIFICANT CHANGE UP (ref 3.5–5)
POTASSIUM SERPL-SCNC: 3.8 MMOL/L — SIGNIFICANT CHANGE UP (ref 3.5–5)
POTASSIUM SERPL-SCNC: 4 MMOL/L — SIGNIFICANT CHANGE UP (ref 3.5–5)
PROT SERPL-MCNC: 5.6 G/DL — LOW (ref 6–8)
PROT SERPL-MCNC: 6 G/DL — SIGNIFICANT CHANGE UP (ref 6–8)
RBC # BLD: 4.34 M/UL — SIGNIFICANT CHANGE UP (ref 4.2–5.4)
RBC # BLD: 4.6 M/UL — SIGNIFICANT CHANGE UP (ref 4.2–5.4)
RBC # FLD: 15.2 % — HIGH (ref 11.5–14.5)
RBC # FLD: 15.2 % — HIGH (ref 11.5–14.5)
SODIUM SERPL-SCNC: 136 MMOL/L — SIGNIFICANT CHANGE UP (ref 135–146)
SODIUM SERPL-SCNC: 138 MMOL/L — SIGNIFICANT CHANGE UP (ref 135–146)
WBC # BLD: 12.33 K/UL — HIGH (ref 4.8–10.8)
WBC # BLD: 12.71 K/UL — HIGH (ref 4.8–10.8)
WBC # FLD AUTO: 12.33 K/UL — HIGH (ref 4.8–10.8)
WBC # FLD AUTO: 12.71 K/UL — HIGH (ref 4.8–10.8)

## 2023-09-02 PROCEDURE — 72156 MRI NECK SPINE W/O & W/DYE: CPT | Mod: 26

## 2023-09-02 PROCEDURE — 99222 1ST HOSP IP/OBS MODERATE 55: CPT

## 2023-09-02 RX ORDER — ZOLPIDEM TARTRATE 10 MG/1
5 TABLET ORAL ONCE
Refills: 0 | Status: DISCONTINUED | OUTPATIENT
Start: 2023-09-02 | End: 2023-09-02

## 2023-09-02 RX ORDER — METHOCARBAMOL 500 MG/1
500 TABLET, FILM COATED ORAL THREE TIMES A DAY
Refills: 0 | Status: DISCONTINUED | OUTPATIENT
Start: 2023-09-02 | End: 2023-09-03

## 2023-09-02 RX ORDER — LANOLIN ALCOHOL/MO/W.PET/CERES
5 CREAM (GRAM) TOPICAL AT BEDTIME
Refills: 0 | Status: DISCONTINUED | OUTPATIENT
Start: 2023-09-02 | End: 2023-09-03

## 2023-09-02 RX ORDER — PANTOPRAZOLE SODIUM 20 MG/1
40 TABLET, DELAYED RELEASE ORAL
Refills: 0 | Status: DISCONTINUED | OUTPATIENT
Start: 2023-09-02 | End: 2023-09-03

## 2023-09-02 RX ORDER — LIDOCAINE 4 G/100G
1 CREAM TOPICAL DAILY
Refills: 0 | Status: DISCONTINUED | OUTPATIENT
Start: 2023-09-02 | End: 2023-09-03

## 2023-09-02 RX ORDER — IBUPROFEN 200 MG
600 TABLET ORAL
Refills: 0 | Status: DISCONTINUED | OUTPATIENT
Start: 2023-09-02 | End: 2023-09-03

## 2023-09-02 RX ADMIN — Medication 5 MILLIGRAM(S): at 00:58

## 2023-09-02 RX ADMIN — Medication 600 MILLIGRAM(S): at 08:27

## 2023-09-02 RX ADMIN — Medication 5 MILLIGRAM(S): at 21:33

## 2023-09-02 RX ADMIN — Medication 600 MILLIGRAM(S): at 17:20

## 2023-09-02 RX ADMIN — Medication 1 MILLIGRAM(S): at 18:32

## 2023-09-02 RX ADMIN — METHOCARBAMOL 500 MILLIGRAM(S): 500 TABLET, FILM COATED ORAL at 16:50

## 2023-09-02 RX ADMIN — LIDOCAINE 1 PATCH: 4 CREAM TOPICAL at 23:17

## 2023-09-02 RX ADMIN — Medication 600 MILLIGRAM(S): at 09:17

## 2023-09-02 RX ADMIN — LIDOCAINE 1 PATCH: 4 CREAM TOPICAL at 11:17

## 2023-09-02 RX ADMIN — Medication 600 MILLIGRAM(S): at 00:58

## 2023-09-02 RX ADMIN — Medication 600 MILLIGRAM(S): at 01:32

## 2023-09-02 RX ADMIN — LIDOCAINE 1 PATCH: 4 CREAM TOPICAL at 21:35

## 2023-09-02 RX ADMIN — ZOLPIDEM TARTRATE 5 MILLIGRAM(S): 10 TABLET ORAL at 02:08

## 2023-09-02 RX ADMIN — METHOCARBAMOL 500 MILLIGRAM(S): 500 TABLET, FILM COATED ORAL at 00:57

## 2023-09-02 RX ADMIN — Medication 600 MILLIGRAM(S): at 16:50

## 2023-09-02 RX ADMIN — METHOCARBAMOL 500 MILLIGRAM(S): 500 TABLET, FILM COATED ORAL at 08:27

## 2023-09-02 RX ADMIN — PANTOPRAZOLE SODIUM 40 MILLIGRAM(S): 20 TABLET, DELAYED RELEASE ORAL at 10:20

## 2023-09-02 NOTE — H&P ADULT - ATTENDING COMMENTS
37-year-old female with PMH of IBS presents for neck pain.     #L sided neck pain rad. to LUE   - DDx: Cervical strain vs Cervical radiculopathy   - Xray- Increased lucency around the left C4-C5 neural foramen, possibly indicating expansion of the foramen versus artifact.  - f/u MRi -cspine, consider neurosx eval  - pain control: tylenol, ibuprofen, Robaxin PRN    - send labs cbc, cmp, ESR, CRP   - PT for exercise therapy and manual therapy     #Misc  - DVT Prophylaxis: not indicated  - Diet:DASH  - Activity:AAT

## 2023-09-02 NOTE — CHART NOTE - NSCHARTNOTEFT_GEN_A_CORE
Pt was seen and examined by myself today. She was evaluated by hospitalist team overnight as well.    ROS: complains of neck stiffness.     P/E:  GEN: No acute distress  LUNGS: Clear to auscultation bilaterally   HEART: S1/S2 present. RRR.   ABD/ GI: Soft, non-tender, non-distended. Bowel sounds present  EXT: NC/NC/NE/2+PP/OWUSU  NEURO: AAOX3    Vital Signs Last 24 Hrs  T(C): 36.1 (02 Sep 2023 12:05), Max: 37.3 (01 Sep 2023 20:36)  T(F): 97 (02 Sep 2023 12:05), Max: 99.2 (01 Sep 2023 20:36)  HR: 51 (02 Sep 2023 12:05) (51 - 83)  BP: 103/70 (02 Sep 2023 12:05) (101/59 - 144/91)  RR: 18 (02 Sep 2023 12:05) (18 - 18)  SpO2: 98% (01 Sep 2023 22:24) (98% - 100%)    Parameters below as of 01 Sep 2023 22:24  Patient On (Oxygen Delivery Method): room air    PLAN: Pt was seen and examined by myself today. She was evaluated by hospitalist team overnight as well.    ROS: complains of neck stiffness.     P/E:  GEN: No acute distress  LUNGS: Clear to auscultation bilaterally   HEART: S1/S2 present. RRR.   ABD/ GI: Soft, non-tender, non-distended. Bowel sounds present  EXT: NC/NC/NE/2+PP/OWUSU  NEURO: AAOX3    Vital Signs Last 24 Hrs  T(C): 36.1 (02 Sep 2023 12:05), Max: 37.3 (01 Sep 2023 20:36)  T(F): 97 (02 Sep 2023 12:05), Max: 99.2 (01 Sep 2023 20:36)  HR: 51 (02 Sep 2023 12:05) (51 - 83)  BP: 103/70 (02 Sep 2023 12:05) (101/59 - 144/91)  RR: 18 (02 Sep 2023 12:05) (18 - 18)  SpO2: 98% (01 Sep 2023 22:24) (98% - 100%)    Parameters below as of 01 Sep 2023 22:24  Patient On (Oxygen Delivery Method): room air    A/P:    37-year-old female with PMH of IBS presents for neck pain.     #L sided neck pain rad. to LUE   - DDx: Cervical strain vs Cervical radiculopathy   - Xray- Increased lucency around the left C4-C5 neural foramen, possibly indicating expansion of the foramen versus artifact.  - f/u MRi -cspine, consider neurosx eval  - pain control: tylenol, ibuprofen, Robaxin PRN  ; add lidocaine patch.   - send labs cbc, cmp, ESR, CRP   - PT for exercise therapy and manual therapy     #IBS stable    #Misc  - DVT Prophylaxis: not indicated  - Diet:DASH  - Activity:AAT .     Dispo: from home; pending MRI C spine; pain control; Anticipate for dc in 24 hours if MRI neg.

## 2023-09-02 NOTE — H&P ADULT - HISTORY OF PRESENT ILLNESS
37-year-old female with PMH of IBS presents for neck pain. Patient was already admitted earlier today but stepped out to get a  from her sister and had to be readmitted . She came in for 3 days of atraumatic dull, left-sided neck pain radiating from the occiput to her posterior left shoulder. Patient states her pain started acutely when she woke up and has been worsening over the past few days. Initially it was an intermittent dull pain, then progressed to a constant pain that is worse with movement of her C-spine. The pain is worse when she turns her head to the left. She states that the pain shoots down to her left arm with tingling, but denies any numbness or weakness. Additionally denies any sick contacts, recent illness, fever, chills, vision change, nausea, vomiting, SOB, chest pain. At home she tried marijuana and Flexeril without improvement. She was being treated with robaxin and ibuprofen and notes improvement in pain  In the ED vitals stable.  No labs available  Xray: Increased lucency around the left C4-C5 neural foramen, possibly indicating expansion of the foramen versus artifact. Consider   cross-sectional imaging to further evaluate.

## 2023-09-03 ENCOUNTER — TRANSCRIPTION ENCOUNTER (OUTPATIENT)
Age: 38
End: 2023-09-03

## 2023-09-03 VITALS
TEMPERATURE: 98 F | RESPIRATION RATE: 18 BRPM | HEART RATE: 57 BPM | DIASTOLIC BLOOD PRESSURE: 71 MMHG | SYSTOLIC BLOOD PRESSURE: 120 MMHG

## 2023-09-03 LAB
ALBUMIN SERPL ELPH-MCNC: 3.9 G/DL — SIGNIFICANT CHANGE UP (ref 3.5–5.2)
ALP SERPL-CCNC: 44 U/L — SIGNIFICANT CHANGE UP (ref 30–115)
ALT FLD-CCNC: 13 U/L — SIGNIFICANT CHANGE UP (ref 0–41)
ANION GAP SERPL CALC-SCNC: 9 MMOL/L — SIGNIFICANT CHANGE UP (ref 7–14)
AST SERPL-CCNC: 17 U/L — SIGNIFICANT CHANGE UP (ref 0–41)
BILIRUB SERPL-MCNC: 0.4 MG/DL — SIGNIFICANT CHANGE UP (ref 0.2–1.2)
BUN SERPL-MCNC: 12 MG/DL — SIGNIFICANT CHANGE UP (ref 10–20)
CALCIUM SERPL-MCNC: 8.7 MG/DL — SIGNIFICANT CHANGE UP (ref 8.4–10.5)
CHLORIDE SERPL-SCNC: 105 MMOL/L — SIGNIFICANT CHANGE UP (ref 98–110)
CO2 SERPL-SCNC: 24 MMOL/L — SIGNIFICANT CHANGE UP (ref 17–32)
CREAT SERPL-MCNC: 0.8 MG/DL — SIGNIFICANT CHANGE UP (ref 0.7–1.5)
EGFR: 98 ML/MIN/1.73M2 — SIGNIFICANT CHANGE UP
GLUCOSE SERPL-MCNC: 80 MG/DL — SIGNIFICANT CHANGE UP (ref 70–99)
HCT VFR BLD CALC: 37.6 % — SIGNIFICANT CHANGE UP (ref 37–47)
HGB BLD-MCNC: 12.4 G/DL — SIGNIFICANT CHANGE UP (ref 12–16)
MAGNESIUM SERPL-MCNC: 1.8 MG/DL — SIGNIFICANT CHANGE UP (ref 1.8–2.4)
MCHC RBC-ENTMCNC: 27.6 PG — SIGNIFICANT CHANGE UP (ref 27–31)
MCHC RBC-ENTMCNC: 33 G/DL — SIGNIFICANT CHANGE UP (ref 32–37)
MCV RBC AUTO: 83.7 FL — SIGNIFICANT CHANGE UP (ref 81–99)
NRBC # BLD: 0 /100 WBCS — SIGNIFICANT CHANGE UP (ref 0–0)
PLATELET # BLD AUTO: 288 K/UL — SIGNIFICANT CHANGE UP (ref 130–400)
PMV BLD: 9.7 FL — SIGNIFICANT CHANGE UP (ref 7.4–10.4)
POTASSIUM SERPL-MCNC: 4 MMOL/L — SIGNIFICANT CHANGE UP (ref 3.5–5)
POTASSIUM SERPL-SCNC: 4 MMOL/L — SIGNIFICANT CHANGE UP (ref 3.5–5)
PROT SERPL-MCNC: 5.8 G/DL — LOW (ref 6–8)
RBC # BLD: 4.49 M/UL — SIGNIFICANT CHANGE UP (ref 4.2–5.4)
RBC # FLD: 15.2 % — HIGH (ref 11.5–14.5)
SODIUM SERPL-SCNC: 138 MMOL/L — SIGNIFICANT CHANGE UP (ref 135–146)
WBC # BLD: 9.02 K/UL — SIGNIFICANT CHANGE UP (ref 4.8–10.8)
WBC # FLD AUTO: 9.02 K/UL — SIGNIFICANT CHANGE UP (ref 4.8–10.8)

## 2023-09-03 PROCEDURE — 99239 HOSP IP/OBS DSCHRG MGMT >30: CPT

## 2023-09-03 RX ORDER — IBUPROFEN 200 MG
1 TABLET ORAL
Qty: 56 | Refills: 0
Start: 2023-09-03 | End: 2023-09-16

## 2023-09-03 RX ORDER — LIDOCAINE 4 G/100G
1 CREAM TOPICAL DAILY
Refills: 0 | Status: DISCONTINUED | OUTPATIENT
Start: 2023-09-03 | End: 2023-09-03

## 2023-09-03 RX ORDER — METHOCARBAMOL 500 MG/1
1 TABLET, FILM COATED ORAL
Qty: 42 | Refills: 0
Start: 2023-09-03 | End: 2023-09-16

## 2023-09-03 RX ORDER — LIDOCAINE 4 G/100G
1 CREAM TOPICAL
Qty: 3 | Refills: 0
Start: 2023-09-03 | End: 2023-09-16

## 2023-09-03 RX ORDER — DIPHENHYDRAMINE HCL 50 MG
25 CAPSULE ORAL EVERY 6 HOURS
Refills: 0 | Status: DISCONTINUED | OUTPATIENT
Start: 2023-09-03 | End: 2023-09-03

## 2023-09-03 RX ORDER — LIDOCAINE 4 G/100G
1 CREAM TOPICAL
Qty: 14 | Refills: 0
Start: 2023-09-03 | End: 2023-09-16

## 2023-09-03 RX ADMIN — METHOCARBAMOL 500 MILLIGRAM(S): 500 TABLET, FILM COATED ORAL at 00:52

## 2023-09-03 RX ADMIN — LIDOCAINE 1 PATCH: 4 CREAM TOPICAL at 11:51

## 2023-09-03 RX ADMIN — LIDOCAINE 1 PATCH: 4 CREAM TOPICAL at 04:16

## 2023-09-03 RX ADMIN — Medication 600 MILLIGRAM(S): at 01:52

## 2023-09-03 RX ADMIN — LIDOCAINE 1 PATCH: 4 CREAM TOPICAL at 07:15

## 2023-09-03 RX ADMIN — PANTOPRAZOLE SODIUM 40 MILLIGRAM(S): 20 TABLET, DELAYED RELEASE ORAL at 07:02

## 2023-09-03 RX ADMIN — Medication 25 MILLIGRAM(S): at 03:44

## 2023-09-03 RX ADMIN — Medication 600 MILLIGRAM(S): at 00:52

## 2023-09-03 NOTE — CHART NOTE - NSCHARTNOTEFT_GEN_A_CORE
37-year-old female with PMH of IBS presents for neck pain. Patient was already admitted earlier today but stepped out to get a  from her sister and had to be readmitted . She came in for 3 days of atraumatic dull, left-sided neck pain radiating from the occiput to her posterior left shoulder. Patient states her pain started acutely when she woke up and has been worsening over the past few days. Initially it was an intermittent dull pain, then progressed to a constant pain that is worse with movement of her C-spine. The pain is worse when she turns her head to the left. She states that the pain shoots down to her left arm with tingling, but denies any numbness or weakness. Additionally denies any sick contacts, recent illness, fever, chills, vision change, nausea, vomiting, SOB, chest pain. At home she tried marijuana and Flexeril without improvement. She was being treated with robaxin and ibuprofen and notes improvement in pain    Neurosurgery was called to review MRI findings. Patient symptoms and pain has improved. Patient can follow up with Dr. Clemons outpatient as needed, pls call [phone].  [phone]

## 2023-09-03 NOTE — DISCHARGE NOTE NURSING/CASE MANAGEMENT/SOCIAL WORK - PATIENT PORTAL LINK FT
You can access the FollowMyHealth Patient Portal offered by [facility] by registering at the following website: [email or web address]. By joining "[email or web address]"’s FollowMyHealth portal, you will also be able to view your health information using other applications (apps) compatible with our system.

## 2023-09-03 NOTE — DISCHARGE NOTE PROVIDER - NSDCCPCAREPLAN_GEN_ALL_CORE_FT
PRINCIPAL DISCHARGE DIAGNOSIS  Diagnosis: Neck pain  Assessment and Plan of Treatment: You came in with neck pain which was treated with pain medicaitons. We sent those pain medication to the pharmacy on file. We performed an MRI of your neck and if negative you are being discharged from the hospital. We will provide you with a script for outpatient Physical therapy.   Please follow up with your pcp

## 2023-09-03 NOTE — DISCHARGE NOTE NURSING/CASE MANAGEMENT/SOCIAL WORK - NSDCPEFALRISK_GEN_ALL_CORE
For information on Fall & Injury Prevention, visit: [email or web address] OR  [email or web address] OR  https://www.cdc.gov/steadi/patient.html

## 2023-09-03 NOTE — SBIRT NOTE ADULT - NSSBIRTDRGBRIEFINTDET_GEN_A_CORE
LMSW tried to educate patient on the effects drugs and how it effect physical body. Patient was not receptive.

## 2023-09-03 NOTE — DISCHARGE NOTE PROVIDER - HOSPITAL COURSE
37-year-old female with PMH of IBS presents for neck pain.     #L sided neck pain rad. to LUE   - DDx: Cervical strain vs Cervical radiculopathy   - Xray- Increased lucency around the left C4-C5 neural foramen, possibly indicating expansion of the foramen versus artifact.  - will discharge if MRi -cspine -gve  - pain control: tylenol, ibuprofen, Robaxin PRN    - send labs cbc, cmp, ESR, CRP   - PT for exercise therapy and manual therapy

## 2023-09-03 NOTE — DISCHARGE NOTE PROVIDER - PROVIDER TOKENS
PROVIDER:[TOKEN:[[id number]],FOLLOWUP:[2 weeks]] PROVIDER:[TOKEN:[[id number]],FOLLOWUP:[2 weeks]],PROVIDER:[TOKEN:[[id number]],FOLLOWUP:[2 weeks]]

## 2023-09-03 NOTE — SBIRT NOTE ADULT - NSSBIRTDRGPASSREFTXDET_GEN_A_CORE
Patient states she smokes weed to calm herself down, patient refuse any intervention and/or any resources.

## 2023-09-03 NOTE — DISCHARGE NOTE PROVIDER - CARE PROVIDER_API CALL
David [address], Admin - Room [address] [place] [address]  Phone: [phone]  Fax: [phone]  Follow Up Time: 2 weeks   David [facility]  [address] [facility], Admin - [address]  [place] [address]  Phone: [phone]  Fax: [phone]  Follow Up Time: 2 weeks    Román Clemons  Neurosurgery  [address] [place] [address], [address]  [place] [address]  Phone: [phone]  Fax: [phone]  Follow Up Time: 2 weeks

## 2023-09-03 NOTE — PROGRESS NOTE ADULT - ASSESSMENT
37-year-old female with PMH of IBS presents for neck pain.     #L sided neck pain rad. to LUE   - DDx: Cervical strain vs Cervical radiculopathy   - Xray- Increased lucency around the left C4-C5 neural foramen, possibly indicating expansion of the foramen versus artifact.  - f/u MRi -cspine  - pain control: tylenol, ibuprofen, Robaxin PRN    - send labs cbc, cmp, ESR, CRP   - PT for exercise therapy and manual therapy     #Misc  - DVT Prophylaxis: not indicated  - Diet:DASH  - Activity:AAT

## 2023-09-03 NOTE — DISCHARGE NOTE PROVIDER - NSDCMRMEDTOKEN_GEN_ALL_CORE_FT
ibuprofen 600 mg oral tablet: 1 tab(s) orally 4 times a day as needed for Moderate Pain (4 - 6)  methocarbamol 500 mg oral tablet: 1 tab(s) orally 3 times a day as needed for Muscle Spasm   ibuprofen 600 mg oral tablet: 1 tab(s) orally 4 times a day as needed for Moderate Pain (4 - 6)  lidocaine 4% topical film: Apply topically to affected area once a day  methocarbamol 500 mg oral tablet: 1 tab(s) orally 3 times a day as needed for Muscle Spasm

## 2023-09-03 NOTE — PROGRESS NOTE ADULT - SUBJECTIVE AND OBJECTIVE BOX
SUBJECTIVE:    Patient is a 37y old Female who presents with a chief complaint of Neck pain (02 Sep 2023 00:20)    Currently admitted to medicine with the primary diagnosis of: Neck Pain    Today is hospital day 2d.     Overnight Events:     Interval Events:    No acute overnight events. patient is tolerating po intake, patient is having BM, and urination. Patient is ambutlating. No active  complaints.       PAST MEDICAL & SURGICAL HISTORY  Colitis    Gastritis, Helicobacter pylori    History of marijuana use    H/O Clostridium difficile infection    History of appendectomy    ALLERGIES:  Bactrim (Unknown)  Compazine (Other)  penicillin (Unknown)  sulfa drugs (Unknown)  Reglan (Other)  latex (Unknown)    MEDICATIONS:  STANDING MEDICATIONS  lidocaine   4% Patch 1 Patch Transdermal daily  melatonin 5 milliGRAM(s) Oral at bedtime  pantoprazole    Tablet 40 milliGRAM(s) Oral before breakfast    PRN MEDICATIONS  diphenhydrAMINE 25 milliGRAM(s) Oral every 6 hours PRN  ibuprofen  Tablet. 600 milliGRAM(s) Oral four times a day PRN  methocarbamol 500 milliGRAM(s) Oral three times a day PRN    VITALS:   ICU Vital Signs Last 24 Hrs  T(C): 36.8 (03 Sep 2023 04:33), Max: 36.8 (03 Sep 2023 04:33)  T(F): 98.2 (03 Sep 2023 04:33), Max: 98.2 (03 Sep 2023 04:33)  HR: 60 (03 Sep 2023 04:33) (51 - 68)  BP: 127/76 (03 Sep 2023 04:33) (103/70 - 127/76)  RR: 18 (03 Sep 2023 04:33) (18 - 18)  SpO2: 98% (03 Sep 2023 04:33) (97% - 98%)    O2 Parameters below as of 02 Sep 2023 20:56  Patient On (Oxygen Delivery Method): room air      LABS:                        12.4   9.02  )-----------( 288      ( 03 Sep 2023 06:16 )             37.6     09-02    136  |  103  |  10  ----------------------------<  92  3.8   |  23  |  0.7    Ca    8.9      02 Sep 2023 11:55    TPro  5.6<L>  /  Alb  3.7  /  TBili  0.5  /  DBili  x   /  AST  15  /  ALT  13  /  AlkPhos  47  09-02      Urinalysis Basic - ( 02 Sep 2023 11:55 )    Color: x / Appearance: x / SG: x / pH: x  Gluc: 92 mg/dL / Ketone: x  / Bili: x / Urobili: x   Blood: x / Protein: x / Nitrite: x   Leuk Esterase: x / RBC: x / WBC x   Sq Epi: x / Non Sq Epi: x / Bacteria: x        Sedimentation Rate, Erythrocyte: 2 mm/Hr (09-02-23 @ 11:55)  Sedimentation Rate, Erythrocyte: 2 mm/Hr (09-02-23 @ 09:20)    RADIOLOGY:    < from: Xray Cervical Spine AP and Lateral Only (09.01.23 @ 13:51) >  IMPRESSION:  1.  No acute osseous abnormality seen.  2.  Mild-moderate degenerative change at C5-C6.  3.  Increased lucency around the left C4-C5 neural foramen, possibly   indicating expansion of the foramen versus artifact. Consider   cross-sectional imaging to further evaluate.    --- End of Report ---    < end of copied text >      PHYSICAL EXAM:    GENERAL: NAD, lying in bed comfortably  CHEST/LUNG: Clear to auscultation bilaterally; No rales, rhonchi, wheezing, or rubs. Unlabored respirations  HEART: Regular rate and rhythm; No murmurs, rubs, or gallops  ABDOMEN: Bowel sounds present; Soft, Nontender, Nondistended. No hepatomegally  EXTREMITIES:  2+ Peripheral Pulses, brisk capillary refill. No clubbing, cyanosis, or edema  NERVOUS SYSTEM:  Alert & Oriented X3, speech clear. No deficits   MSK: FROM all 4 extremities, full and equal strength  SKIN: No rashes or lesions

## 2023-09-11 DIAGNOSIS — Z88.0 ALLERGY STATUS TO PENICILLIN: ICD-10-CM

## 2023-09-11 DIAGNOSIS — Z91.040 LATEX ALLERGY STATUS: ICD-10-CM

## 2023-09-11 DIAGNOSIS — Z88.2 ALLERGY STATUS TO SULFONAMIDES: ICD-10-CM

## 2023-09-11 DIAGNOSIS — F12.10 CANNABIS ABUSE, UNCOMPLICATED: ICD-10-CM

## 2023-09-11 DIAGNOSIS — M54.12 RADICULOPATHY, CERVICAL REGION: ICD-10-CM

## 2023-09-11 DIAGNOSIS — K58.9 IRRITABLE BOWEL SYNDROME WITHOUT DIARRHEA: ICD-10-CM

## 2023-09-11 DIAGNOSIS — Z90.49 ACQUIRED ABSENCE OF OTHER SPECIFIED PARTS OF DIGESTIVE TRACT: ICD-10-CM

## 2023-09-25 NOTE — ED ADULT TRIAGE NOTE - TEMPERATURE IN CELSIUS (DEGREES C)
Last Appt:  7/12/2023  Next Appt:   10/16/2023  Med verified in [address] [place] [address] 15
36.3

## 2023-10-19 NOTE — ED PROVIDER NOTE - EMPLOYMENT
Pre Op Diagnosis: right Sacroiliitis     Post Op: the same    Procedure: right sacroiliac joint steroid injection under fluoroscopic guidance      After informed consent was obtained and site was marked patient was positioned prone on the procedure table. Low back area was prepped and draped in a sterile fashion. Using slightly oblique fluoroscopic view low part of sacroiliac joint \"opened up\" and was marked on the skin. After local anesthesia 1% lidocaine 5cc 22g 3.5 in spinal needle was placed under real time fluoroscopy inside the joint. Placement was confirmed by injecting small amount of Isovue Dye that showed good spread of the contrast inside of the joint. Marcaine 0.5% 0.5cc mixed with 40mg DepoMedrol was injected.     There were no complications.  Fluoroscopy time:  13 sec  Pt tolerated well and was discharged home after appropriate observation.     Severo Hodges MD  10/19/2023   N/A

## 2023-11-09 ENCOUNTER — NON-APPOINTMENT (OUTPATIENT)
Age: 38
End: 2023-11-09

## 2024-04-29 ENCOUNTER — NON-APPOINTMENT (OUTPATIENT)
Age: 39
End: 2024-04-29

## 2024-07-24 ENCOUNTER — NON-APPOINTMENT (OUTPATIENT)
Age: 39
End: 2024-07-24

## 2024-08-26 NOTE — ED ADULT TRIAGE NOTE - CCCP TRG CHIEF CMPLNT
Pt would like her Trokendi and Nurtec scripts sent to [facility] in [place] and the Em[facility] [place] [facility]     urinary symptoms

## 2024-09-09 NOTE — PATIENT PROFILE ADULT - ARRIVAL FROM
09/09 LVM to reschedule the patients cancelled visit with the provider.    Notified the family that the provider is scheduling out to Dec. In [place].    Please assist in scheduling if the family calls back.    Thanks    Letter sent   Home

## 2024-10-18 NOTE — ED ADULT TRIAGE NOTE - ARRIVAL FROM
Home MEDICATIONS  (PRN):  acetaminophen     Tablet .. 650 milliGRAM(s) Oral every 6 hours PRN Temp greater or equal to 38C (100.4F), Mild Pain (1 - 3)  aluminum hydroxide/magnesium hydroxide/simethicone Suspension 30 milliLiter(s) Oral every 6 hours PRN Dyspepsia  diphenhydrAMINE 50 milliGRAM(s) Oral every 6 hours PRN Extrapyramidal symptoms or prophylaxis  diphenhydrAMINE Injectable 50 milliGRAM(s) IntraMuscular once PRN Extrapyramidal prophylaxis  haloperidol     Tablet 5 milliGRAM(s) Oral every 6 hours PRN agitation  haloperidol    Injectable 5 milliGRAM(s) IntraMuscular once PRN aggression  LORazepam     Tablet 2 milliGRAM(s) Oral every 6 hours PRN severe anxiety, agitation  LORazepam   Injectable 2 milliGRAM(s) IntraMuscular once PRN agitation  magnesium hydroxide Suspension 30 milliLiter(s) Oral daily PRN Constipation  traZODone 50 milliGRAM(s) Oral at bedtime PRN insomnia

## 2024-12-12 NOTE — ED ADULT TRIAGE NOTE - WEIGHT METHOD
Labs ordered 6/24 by FM. New order placed. Refill x 1 month. Patient does need appointment- ok with NK if stable   stated
